# Patient Record
Sex: FEMALE | Race: WHITE | NOT HISPANIC OR LATINO | Employment: OTHER | URBAN - METROPOLITAN AREA
[De-identification: names, ages, dates, MRNs, and addresses within clinical notes are randomized per-mention and may not be internally consistent; named-entity substitution may affect disease eponyms.]

---

## 2018-02-02 ENCOUNTER — OFFICE VISIT (OUTPATIENT)
Dept: URGENT CARE | Facility: CLINIC | Age: 58
End: 2018-02-02
Payer: COMMERCIAL

## 2018-02-02 VITALS
BODY MASS INDEX: 28 KG/M2 | HEART RATE: 88 BPM | SYSTOLIC BLOOD PRESSURE: 130 MMHG | OXYGEN SATURATION: 98 % | TEMPERATURE: 98.7 F | DIASTOLIC BLOOD PRESSURE: 80 MMHG | HEIGHT: 64 IN | WEIGHT: 164 LBS

## 2018-02-02 DIAGNOSIS — R05.9 COUGH: ICD-10-CM

## 2018-02-02 DIAGNOSIS — J06.9 UPPER RESPIRATORY TRACT INFECTION, UNSPECIFIED TYPE: ICD-10-CM

## 2018-02-02 PROCEDURE — 99214 OFFICE O/P EST MOD 30 MIN: CPT | Performed by: PHYSICIAN ASSISTANT

## 2018-02-02 RX ORDER — AZITHROMYCIN 250 MG/1
TABLET, FILM COATED ORAL
Qty: 6 TAB | Refills: 0 | Status: SHIPPED | OUTPATIENT
Start: 2018-02-02 | End: 2020-06-24

## 2018-02-02 RX ORDER — BENZONATATE 100 MG/1
100 CAPSULE ORAL 3 TIMES DAILY PRN
Qty: 60 CAP | Refills: 0 | Status: SHIPPED | OUTPATIENT
Start: 2018-02-02 | End: 2020-06-24

## 2018-02-02 ASSESSMENT — ENCOUNTER SYMPTOMS
COUGH: 1
WHEEZING: 0
DIARRHEA: 0
SHORTNESS OF BREATH: 0
MYALGIAS: 0
VOMITING: 0
SPUTUM PRODUCTION: 0
FEVER: 0
NAUSEA: 0
SORE THROAT: 0
CHILLS: 0
ABDOMINAL PAIN: 0

## 2018-02-02 NOTE — PROGRESS NOTES
"Subjective:   Chioma Yanez is a 57 y.o. female who presents for Cough (\"has been having a cough for about 3 weeks now\")        Cough   This is a new problem. The current episode started 1 to 4 weeks ago. The cough is non-productive. Associated symptoms include nasal congestion. Pertinent negatives include no chills, ear pain, fever, myalgias ( resolved), rash, sore throat, shortness of breath or wheezing. Her past medical history is significant for environmental allergies.   notes fever/chills cough congestion body aches 3wks ago, now left w/ lingering cough at night, some production, denies much sinus congestion, denies ST/ear pain, denies chest congestion, denies nausea/vomitint/abdpain/diarrhea/rash, denies pMH of asthma, PMH of bronchitis/pneumonia years ago, PMH of seasonal allerg, tried honey, lemon, vinegar.     Review of Systems   Constitutional: Negative for chills, fever and malaise/fatigue.   HENT: Positive for congestion. Negative for ear pain and sore throat.    Respiratory: Positive for cough. Negative for sputum production, shortness of breath and wheezing.    Gastrointestinal: Negative for abdominal pain, diarrhea, nausea and vomiting.   Musculoskeletal: Negative for myalgias ( resolved).   Skin: Negative for rash.   Endo/Heme/Allergies: Positive for environmental allergies.     Allergies   Allergen Reactions   • Codeine Shortness of Breath      Objective:   /80   Pulse 88   Temp 37.1 °C (98.7 °F)   Ht 1.626 m (5' 4\")   Wt 74.4 kg (164 lb)   SpO2 98%   BMI 28.15 kg/m²   Physical Exam   Constitutional: She is oriented to person, place, and time. She appears well-developed and well-nourished. No distress.   HENT:   Head: Normocephalic and atraumatic.   Right Ear: Tympanic membrane, external ear and ear canal normal.   Left Ear: Tympanic membrane, external ear and ear canal normal.   Nose: Nose normal.   Mouth/Throat: Uvula is midline and mucous membranes are normal. Posterior " oropharyngeal erythema ( mild PND) present. No oropharyngeal exudate, posterior oropharyngeal edema or tonsillar abscesses.   Eyes: Conjunctivae and lids are normal. Right eye exhibits no discharge. Left eye exhibits no discharge. No scleral icterus.   Neck: Neck supple.   Pulmonary/Chest: Effort normal. No respiratory distress. She has no decreased breath sounds. She has no wheezes. She has no rhonchi. She has no rales.   Musculoskeletal: Normal range of motion.   Lymphadenopathy:     She has cervical adenopathy ( mild bilat).   Neurological: She is alert and oriented to person, place, and time. She is not disoriented.   Skin: Skin is warm and dry. She is not diaphoretic. No erythema. No pallor.   Psychiatric: Her speech is normal and behavior is normal.   Nursing note and vitals reviewed.        Assessment/Plan:   Assessment    1. Upper respiratory tract infection, unspecified type  Supportive care is reviewed with patient/caregiver - recommend to push PO fluids and electrolytes, Nsaids/tylenol, netti pot/saline irrig, humidifier in home, flonase, cough suppressant, discuss likely resolving URI s/sx, normal timeframe and s/sx - Contingent antibiotic prescription given to patient to fill upon meeting criteria of guidelines discussed.   If filling , take full course of Rx, take with probiotics, observe for resolution  Return to clinic with lack of resolution or progression of symptoms.    - azithromycin (ZITHROMAX) 250 MG Tab; Take as directed on package. Dispense one package.  Dispense: 6 Tab; Refill: 0    2. Cough  - benzonatate (TESSALON) 100 MG Cap; Take 1 Cap by mouth 3 times a day as needed for Cough.  Dispense: 60 Cap; Refill: 0    Differential diagnosis, natural history, supportive care, and indications for immediate follow-up discussed.

## 2018-03-26 ENCOUNTER — HOSPITAL ENCOUNTER (OUTPATIENT)
Dept: LAB | Facility: MEDICAL CENTER | Age: 58
End: 2018-03-26
Attending: FAMILY MEDICINE
Payer: COMMERCIAL

## 2018-03-26 LAB
25(OH)D3 SERPL-MCNC: 30 NG/ML (ref 30–100)
ALBUMIN SERPL BCP-MCNC: 4.2 G/DL (ref 3.2–4.9)
ALBUMIN/GLOB SERPL: 1.4 G/DL
ALP SERPL-CCNC: 74 U/L (ref 30–99)
ALT SERPL-CCNC: 15 U/L (ref 2–50)
ANION GAP SERPL CALC-SCNC: 6 MMOL/L (ref 0–11.9)
AST SERPL-CCNC: 14 U/L (ref 12–45)
BASOPHILS # BLD AUTO: 0.7 % (ref 0–1.8)
BASOPHILS # BLD: 0.04 K/UL (ref 0–0.12)
BILIRUB SERPL-MCNC: 0.6 MG/DL (ref 0.1–1.5)
BUN SERPL-MCNC: 13 MG/DL (ref 8–22)
CALCIUM SERPL-MCNC: 9 MG/DL (ref 8.5–10.5)
CHLORIDE SERPL-SCNC: 107 MMOL/L (ref 96–112)
CHOLEST SERPL-MCNC: 221 MG/DL (ref 100–199)
CO2 SERPL-SCNC: 26 MMOL/L (ref 20–33)
CREAT SERPL-MCNC: 0.61 MG/DL (ref 0.5–1.4)
CREAT UR-MCNC: 136.8 MG/DL
EOSINOPHIL # BLD AUTO: 0.13 K/UL (ref 0–0.51)
EOSINOPHIL NFR BLD: 2.1 % (ref 0–6.9)
ERYTHROCYTE [DISTWIDTH] IN BLOOD BY AUTOMATED COUNT: 45.9 FL (ref 35.9–50)
EST. AVERAGE GLUCOSE BLD GHB EST-MCNC: 137 MG/DL
GLOBULIN SER CALC-MCNC: 3.1 G/DL (ref 1.9–3.5)
GLUCOSE SERPL-MCNC: 122 MG/DL (ref 65–99)
HBA1C MFR BLD: 6.4 % (ref 0–5.6)
HCT VFR BLD AUTO: 44 % (ref 37–47)
HDLC SERPL-MCNC: 40 MG/DL
HGB BLD-MCNC: 14 G/DL (ref 12–16)
IMM GRANULOCYTES # BLD AUTO: 0.03 K/UL (ref 0–0.11)
IMM GRANULOCYTES NFR BLD AUTO: 0.5 % (ref 0–0.9)
LDLC SERPL CALC-MCNC: 136 MG/DL
LYMPHOCYTES # BLD AUTO: 1.96 K/UL (ref 1–4.8)
LYMPHOCYTES NFR BLD: 32.3 % (ref 22–41)
MCH RBC QN AUTO: 29 PG (ref 27–33)
MCHC RBC AUTO-ENTMCNC: 31.8 G/DL (ref 33.6–35)
MCV RBC AUTO: 91.1 FL (ref 81.4–97.8)
MICROALBUMIN UR-MCNC: <0.7 MG/DL
MICROALBUMIN/CREAT UR: NORMAL MG/G (ref 0–30)
MONOCYTES # BLD AUTO: 0.4 K/UL (ref 0–0.85)
MONOCYTES NFR BLD AUTO: 6.6 % (ref 0–13.4)
NEUTROPHILS # BLD AUTO: 3.51 K/UL (ref 2–7.15)
NEUTROPHILS NFR BLD: 57.8 % (ref 44–72)
NRBC # BLD AUTO: 0 K/UL
NRBC BLD-RTO: 0 /100 WBC
PLATELET # BLD AUTO: 259 K/UL (ref 164–446)
PMV BLD AUTO: 9.9 FL (ref 9–12.9)
POTASSIUM SERPL-SCNC: 4.1 MMOL/L (ref 3.6–5.5)
PROT SERPL-MCNC: 7.3 G/DL (ref 6–8.2)
RBC # BLD AUTO: 4.83 M/UL (ref 4.2–5.4)
SODIUM SERPL-SCNC: 139 MMOL/L (ref 135–145)
TRIGL SERPL-MCNC: 224 MG/DL (ref 0–149)
VIT B12 SERPL-MCNC: 486 PG/ML (ref 211–911)
WBC # BLD AUTO: 6.1 K/UL (ref 4.8–10.8)

## 2018-03-26 PROCEDURE — 82306 VITAMIN D 25 HYDROXY: CPT

## 2018-03-26 PROCEDURE — 82607 VITAMIN B-12: CPT

## 2018-03-26 PROCEDURE — 36415 COLL VENOUS BLD VENIPUNCTURE: CPT

## 2018-03-26 PROCEDURE — 80053 COMPREHEN METABOLIC PANEL: CPT

## 2018-03-26 PROCEDURE — 83036 HEMOGLOBIN GLYCOSYLATED A1C: CPT

## 2018-03-26 PROCEDURE — 80061 LIPID PANEL: CPT

## 2018-03-26 PROCEDURE — 82043 UR ALBUMIN QUANTITATIVE: CPT

## 2018-03-26 PROCEDURE — 85025 COMPLETE CBC W/AUTO DIFF WBC: CPT

## 2018-03-26 PROCEDURE — 82570 ASSAY OF URINE CREATININE: CPT

## 2019-08-22 ENCOUNTER — HOSPITAL ENCOUNTER (OUTPATIENT)
Dept: LAB | Facility: MEDICAL CENTER | Age: 59
End: 2019-08-22
Attending: FAMILY MEDICINE
Payer: COMMERCIAL

## 2019-08-22 LAB
25(OH)D3 SERPL-MCNC: 22 NG/ML (ref 30–100)
ALBUMIN SERPL BCP-MCNC: 4.1 G/DL (ref 3.2–4.9)
ALBUMIN/GLOB SERPL: 1.2 G/DL
ALP SERPL-CCNC: 65 U/L (ref 30–99)
ALT SERPL-CCNC: 18 U/L (ref 2–50)
ANION GAP SERPL CALC-SCNC: 9 MMOL/L (ref 0–11.9)
AST SERPL-CCNC: 19 U/L (ref 12–45)
BASOPHILS # BLD AUTO: 0.8 % (ref 0–1.8)
BASOPHILS # BLD: 0.06 K/UL (ref 0–0.12)
BILIRUB SERPL-MCNC: 0.7 MG/DL (ref 0.1–1.5)
BUN SERPL-MCNC: 12 MG/DL (ref 8–22)
CALCIUM SERPL-MCNC: 9.3 MG/DL (ref 8.5–10.5)
CHLORIDE SERPL-SCNC: 105 MMOL/L (ref 96–112)
CHOLEST SERPL-MCNC: 228 MG/DL (ref 100–199)
CO2 SERPL-SCNC: 26 MMOL/L (ref 20–33)
CREAT SERPL-MCNC: 0.53 MG/DL (ref 0.5–1.4)
CREAT UR-MCNC: 154.9 MG/DL
EOSINOPHIL # BLD AUTO: 0.14 K/UL (ref 0–0.51)
EOSINOPHIL NFR BLD: 1.8 % (ref 0–6.9)
ERYTHROCYTE [DISTWIDTH] IN BLOOD BY AUTOMATED COUNT: 46.7 FL (ref 35.9–50)
EST. AVERAGE GLUCOSE BLD GHB EST-MCNC: 157 MG/DL
GLOBULIN SER CALC-MCNC: 3.3 G/DL (ref 1.9–3.5)
GLUCOSE SERPL-MCNC: 133 MG/DL (ref 65–99)
HBA1C MFR BLD: 7.1 % (ref 0–5.6)
HCT VFR BLD AUTO: 44 % (ref 37–47)
HDLC SERPL-MCNC: 35 MG/DL
HGB BLD-MCNC: 13.7 G/DL (ref 12–16)
IMM GRANULOCYTES # BLD AUTO: 0.02 K/UL (ref 0–0.11)
IMM GRANULOCYTES NFR BLD AUTO: 0.3 % (ref 0–0.9)
LDLC SERPL CALC-MCNC: 154 MG/DL
LYMPHOCYTES # BLD AUTO: 2.42 K/UL (ref 1–4.8)
LYMPHOCYTES NFR BLD: 31.6 % (ref 22–41)
MCH RBC QN AUTO: 28.6 PG (ref 27–33)
MCHC RBC AUTO-ENTMCNC: 31.1 G/DL (ref 33.6–35)
MCV RBC AUTO: 91.9 FL (ref 81.4–97.8)
MICROALBUMIN UR-MCNC: 0.7 MG/DL
MICROALBUMIN/CREAT UR: 5 MG/G (ref 0–30)
MONOCYTES # BLD AUTO: 0.55 K/UL (ref 0–0.85)
MONOCYTES NFR BLD AUTO: 7.2 % (ref 0–13.4)
NEUTROPHILS # BLD AUTO: 4.47 K/UL (ref 2–7.15)
NEUTROPHILS NFR BLD: 58.3 % (ref 44–72)
NRBC # BLD AUTO: 0 K/UL
NRBC BLD-RTO: 0 /100 WBC
PLATELET # BLD AUTO: 273 K/UL (ref 164–446)
PMV BLD AUTO: 9.8 FL (ref 9–12.9)
POTASSIUM SERPL-SCNC: 3.9 MMOL/L (ref 3.6–5.5)
PROT SERPL-MCNC: 7.4 G/DL (ref 6–8.2)
RBC # BLD AUTO: 4.79 M/UL (ref 4.2–5.4)
SODIUM SERPL-SCNC: 140 MMOL/L (ref 135–145)
TRIGL SERPL-MCNC: 193 MG/DL (ref 0–149)
VIT B12 SERPL-MCNC: 595 PG/ML (ref 211–911)
WBC # BLD AUTO: 7.7 K/UL (ref 4.8–10.8)

## 2019-08-22 PROCEDURE — 83036 HEMOGLOBIN GLYCOSYLATED A1C: CPT

## 2019-08-22 PROCEDURE — 82043 UR ALBUMIN QUANTITATIVE: CPT

## 2019-08-22 PROCEDURE — 36415 COLL VENOUS BLD VENIPUNCTURE: CPT

## 2019-08-22 PROCEDURE — 80053 COMPREHEN METABOLIC PANEL: CPT

## 2019-08-22 PROCEDURE — 85025 COMPLETE CBC W/AUTO DIFF WBC: CPT

## 2019-08-22 PROCEDURE — 82306 VITAMIN D 25 HYDROXY: CPT

## 2019-08-22 PROCEDURE — 82570 ASSAY OF URINE CREATININE: CPT

## 2019-08-22 PROCEDURE — 80061 LIPID PANEL: CPT

## 2019-08-22 PROCEDURE — 82607 VITAMIN B-12: CPT

## 2019-09-23 ENCOUNTER — APPOINTMENT (OUTPATIENT)
Dept: RADIOLOGY | Facility: MEDICAL CENTER | Age: 59
End: 2019-09-23
Attending: FAMILY MEDICINE
Payer: COMMERCIAL

## 2019-10-21 ENCOUNTER — APPOINTMENT (OUTPATIENT)
Dept: RADIOLOGY | Facility: MEDICAL CENTER | Age: 59
End: 2019-10-21
Attending: FAMILY MEDICINE
Payer: COMMERCIAL

## 2019-12-04 ENCOUNTER — APPOINTMENT (OUTPATIENT)
Dept: RADIOLOGY | Facility: MEDICAL CENTER | Age: 59
End: 2019-12-04
Attending: FAMILY MEDICINE
Payer: COMMERCIAL

## 2020-01-22 ENCOUNTER — HOSPITAL ENCOUNTER (OUTPATIENT)
Dept: RADIOLOGY | Facility: MEDICAL CENTER | Age: 60
End: 2020-01-22
Attending: FAMILY MEDICINE
Payer: COMMERCIAL

## 2020-01-22 DIAGNOSIS — Z00.00 ROUTINE GENERAL MEDICAL EXAMINATION AT A HEALTH CARE FACILITY: ICD-10-CM

## 2020-01-22 PROCEDURE — 77067 SCR MAMMO BI INCL CAD: CPT

## 2020-06-19 ENCOUNTER — OFFICE VISIT (OUTPATIENT)
Dept: URGENT CARE | Facility: CLINIC | Age: 60
End: 2020-06-19
Payer: COMMERCIAL

## 2020-06-19 ENCOUNTER — APPOINTMENT (OUTPATIENT)
Dept: RADIOLOGY | Facility: IMAGING CENTER | Age: 60
End: 2020-06-19
Attending: FAMILY MEDICINE
Payer: COMMERCIAL

## 2020-06-19 VITALS
RESPIRATION RATE: 18 BRPM | TEMPERATURE: 99.2 F | BODY MASS INDEX: 28.15 KG/M2 | OXYGEN SATURATION: 97 % | DIASTOLIC BLOOD PRESSURE: 78 MMHG | SYSTOLIC BLOOD PRESSURE: 124 MMHG | HEART RATE: 78 BPM | WEIGHT: 164 LBS

## 2020-06-19 DIAGNOSIS — S93.402A SPRAIN OF LEFT ANKLE, UNSPECIFIED LIGAMENT, INITIAL ENCOUNTER: ICD-10-CM

## 2020-06-19 PROCEDURE — 73610 X-RAY EXAM OF ANKLE: CPT | Mod: TC,LT | Performed by: FAMILY MEDICINE

## 2020-06-19 PROCEDURE — 99214 OFFICE O/P EST MOD 30 MIN: CPT | Performed by: FAMILY MEDICINE

## 2020-06-19 SDOH — HEALTH STABILITY: MENTAL HEALTH: HOW OFTEN DO YOU HAVE A DRINK CONTAINING ALCOHOL?: 2-3 TIMES A WEEK

## 2020-06-19 ASSESSMENT — FIBROSIS 4 INDEX: FIB4 SCORE: 0.98

## 2020-06-19 NOTE — PROGRESS NOTES
Subjective:      Chief Complaint   Patient presents with   • Ankle Injury     x3 days, has previous knee injury which caused fall and pt states she twisted ankle             Ankle Injury   The incident occurred 2 d  ago. The incident occurred  while hiking. The injury mechanism was an inversion injury. The pain is present in the left ankle. The pain is moderate. The pain has been constant since onset. Pertinent negatives include no inability to bear weight, loss of motion, muscle weakness, numbness or tingling. The symptoms are aggravated by weight bearing and palpation. pt has tried acetaminophen for the symptoms. The treatment provided mild relief.       Social History     Tobacco Use   • Smoking status: Never Smoker   • Smokeless tobacco: Never Used   Substance Use Topics   • Alcohol use: Yes     Alcohol/week: 0.6 oz     Types: 1 Standard drinks or equivalent per week     Frequency: 2-3 times a week   • Drug use: No         Past Medical History:   Diagnosis Date   • Arthritis    • Palpitations          Review of Systems   Constitutional: Negative for fever.   Respiratory: Negative for shortness of breath.    Cardiovascular: Negative for chest pain.   Neurological: Negative for tingling and numbness.   All other systems reviewed and are negative.         Objective:     /78   Pulse 78   Temp 37.3 °C (99.2 °F) (Temporal)   Resp 18   Wt 74.4 kg (164 lb)   SpO2 97%     Physical Exam   Constitutional: pt is oriented to person, place, and time. Pt appears well-developed. No distress.   HENT:   Head: Normocephalic and atraumatic.   Eyes: Conjunctivae are normal.   Cardiovascular: Normal rate and regular rhythm.    Pulmonary/Chest: Effort normal and breath sounds normal.   Musculoskeletal:        Left ankle: pt exhibits swelling and ecchymosis. Pt exhibits normal range of motion. Tenderness. Lateral malleolus tenderness found. Achilles tendon normal.        Left foot: There is normal range of motion, normal  capillary refill and no crepitus.   Neurological: pt is alert and oriented to person, place, and time. No cranial nerve deficit.   Skin: Skin is warm. Pt is not diaphoretic. No erythema.   Psychiatric: His behavior is normal.   Nursing note and vitals reviewed.              Assessment/Plan:     1. Sprain of left ankle, unspecified ligament, initial encounter    X-rays were personally reviewed by myself.   There is a distal fib fracture.       NWB on left - crutches given  Stirrup splint applied  Referred to sports med    rx motrin 800mg tid prn

## 2020-06-24 ENCOUNTER — OFFICE VISIT (OUTPATIENT)
Dept: MEDICAL GROUP | Facility: CLINIC | Age: 60
End: 2020-06-24
Payer: COMMERCIAL

## 2020-06-24 VITALS
SYSTOLIC BLOOD PRESSURE: 136 MMHG | BODY MASS INDEX: 28 KG/M2 | DIASTOLIC BLOOD PRESSURE: 80 MMHG | HEIGHT: 64 IN | HEART RATE: 80 BPM | WEIGHT: 164 LBS | RESPIRATION RATE: 14 BRPM | OXYGEN SATURATION: 98 % | TEMPERATURE: 98.2 F

## 2020-06-24 DIAGNOSIS — S82.62XA CLOSED DISPLACED FRACTURE OF LATERAL MALLEOLUS OF LEFT FIBULA, INITIAL ENCOUNTER: ICD-10-CM

## 2020-06-24 PROCEDURE — 99214 OFFICE O/P EST MOD 30 MIN: CPT | Performed by: FAMILY MEDICINE

## 2020-06-24 ASSESSMENT — ENCOUNTER SYMPTOMS
FOCAL WEAKNESS: 0
VOMITING: 0
FEVER: 0
SHORTNESS OF BREATH: 0

## 2020-06-24 ASSESSMENT — FIBROSIS 4 INDEX: FIB4 SCORE: 0.98

## 2020-07-01 ENCOUNTER — APPOINTMENT (OUTPATIENT)
Dept: RADIOLOGY | Facility: IMAGING CENTER | Age: 60
End: 2020-07-01
Attending: FAMILY MEDICINE
Payer: COMMERCIAL

## 2020-07-01 ENCOUNTER — OFFICE VISIT (OUTPATIENT)
Dept: MEDICAL GROUP | Facility: CLINIC | Age: 60
End: 2020-07-01
Payer: COMMERCIAL

## 2020-07-01 VITALS
HEIGHT: 64 IN | HEART RATE: 72 BPM | DIASTOLIC BLOOD PRESSURE: 72 MMHG | WEIGHT: 164 LBS | SYSTOLIC BLOOD PRESSURE: 128 MMHG | BODY MASS INDEX: 28 KG/M2 | OXYGEN SATURATION: 98 % | RESPIRATION RATE: 14 BRPM | TEMPERATURE: 98.6 F

## 2020-07-01 DIAGNOSIS — M79.672 LEFT FOOT PAIN: ICD-10-CM

## 2020-07-01 DIAGNOSIS — S82.62XA CLOSED DISPLACED FRACTURE OF LATERAL MALLEOLUS OF LEFT FIBULA, INITIAL ENCOUNTER: ICD-10-CM

## 2020-07-01 PROCEDURE — 99213 OFFICE O/P EST LOW 20 MIN: CPT | Performed by: FAMILY MEDICINE

## 2020-07-01 PROCEDURE — 73610 X-RAY EXAM OF ANKLE: CPT | Mod: TC,LT | Performed by: FAMILY MEDICINE

## 2020-07-01 PROCEDURE — 73630 X-RAY EXAM OF FOOT: CPT | Mod: TC,LT | Performed by: FAMILY MEDICINE

## 2020-07-01 ASSESSMENT — FIBROSIS 4 INDEX: FIB4 SCORE: 0.98

## 2020-07-01 NOTE — PROGRESS NOTES
Subjective:     Chioma Yanez is a 60 y.o. female who presents for Ankle Injury (Left ankle injury follow up and recheck.)    HPI  Pt presents for follow-up left lateral malleolus fracture  Pt with injury about 2 weeks ago   Has been in walking boot for the past 1.5 weeks  Has noticed some improvement in pain and swelling  Able to start walking and walking boot  No repeat falls or injuries  No numbness or tingling  No new complaints today    Review of Systems   Constitutional: Negative for fever.   Respiratory: Negative for shortness of breath.    Cardiovascular: Negative for chest pain.   Gastrointestinal: Negative for vomiting.   Musculoskeletal: Positive for joint pain.   Skin: Negative for rash.   Neurological: Negative for tingling and sensory change.     PMH:  has a past medical history of Arthritis and Palpitations. She also has no past medical history of Breast cancer (HCC).  MEDS:   Current Outpatient Medications:   •  metFORMIN (GLUCOPHAGE) 500 MG Tab, Take 500 mg by mouth every day., Disp: , Rfl:   •  cyanocobalamin (VITAMIN B-12) 100 MCG Tab, Take 100 mcg by mouth every day., Disp: , Rfl:   •  Flaxseed, Linseed, (FLAX SEED OIL PO), Take 1 Cap by mouth every day., Disp: , Rfl:   •  ibuprofen (MOTRIN) 200 MG Tab, Take 200 mg by mouth every 6 hours as needed for Mild Pain or Inflammation., Disp: , Rfl:   •  B Complex Vitamins (VITAMIN B COMPLEX PO), Take 1 Capsule by mouth every day., Disp: , Rfl:   •  ascorbic acid (ASCORBIC ACID) 500 MG Tab, Take 500 mg by mouth every day., Disp: , Rfl:   •  Calcium-Magnesium-Zinc (MICAELA-MAG-ZINC PO), Take 1 Capsule by mouth every day., Disp: , Rfl:   ALLERGIES:   Allergies   Allergen Reactions   • Codeine Shortness of Breath     SURGHX:   Past Surgical History:   Procedure Laterality Date   • KNEE ARTHROSCOPY Left 12/2/2015    Procedure: KNEE ARTHROSCOPY;  Surgeon: Edwin Simeon M.D.;  Location: SURGERY [facility];  Service:    • MENISCECTOMY Left 12/2/2015  "   Procedure: MENISCECTOMY partial lateral ;  Surgeon: Edwin Simeon M.D.;  Location: SURGERY [facility];  Service:    • CHONDROPLASTY Left 12/2/2015    Procedure: CHONDROPLASTY;  Surgeon: Edwin Simeon M.D.;  Location: SURGERY [facility];  Service:    • PB REPAIR EYE BLOWOUT,PERIORB+IMPLNT  2006    Left eye fracture repair   • PB C-SEC ONLY,PBEV C-SEC  1981     SOCHX:  reports that she has never smoked. She has never used smokeless tobacco. She reports current alcohol use of about 0.6 oz of alcohol per week. She reports that she does not use drugs.  FH: Family history was reviewed, not contributing to acute injury     Objective:   /72 (BP Location: Left arm, Patient Position: Sitting, BP Cuff Size: Adult)   Pulse 72   Temp 37 °C (98.6 °F) (Temporal)   Resp 14   Ht 1.626 m (5' 4\")   Wt 74.4 kg (164 lb)   SpO2 98%   BMI 28.15 kg/m²     Physical Exam  Constitutional:       General: She is not in acute distress.     Appearance: She is well-developed. She is not diaphoretic.   HENT:      Head: Normocephalic and atraumatic.   Musculoskeletal:      Comments: Left ankle/foot:  Appearance - +Mild swelling throughout foot   Palpation - +TTP along dorsal midfoot and lateral malleolus   Neurovascular - 2+ dorsalis pedis and posterior tibial.  Sensation intact and equal bilaterally   Skin:     General: Skin is warm and dry.      Findings: No erythema.   Neurological:      Mental Status: She is alert and oriented to person, place, and time.      Sensory: No sensory deficit.   Psychiatric:         Behavior: Behavior normal.         Thought Content: Thought content normal.         Judgment: Judgment normal.         Assessment/Plan:   Assessment    1. Closed displaced fracture of lateral malleolus of left fibula, initial encounter  - DX-ANKLE 3+ VIEWS LEFT; Future  - REFERRAL TO PHYSICAL THERAPY Reason for Therapy: Eval/Treat/Report    2. Left foot pain  - DX-FOOT-COMPLETE 3+ LEFT; Future  - REFERRAL TO " PHYSICAL THERAPY Reason for Therapy: Eval/Treat/Report    Patient with distal fibular fracture which has been immobilized using tall Cam walking boot.  Repeat x-ray today does not show worsening of fracture line.  Foot x-rays do not show missed fracture in foot.  Will complete full 6 weeks of immobilization (until August 5, 2020).  Patient will follow-up in 4 weeks to reevaluate.

## 2020-07-14 ASSESSMENT — ENCOUNTER SYMPTOMS
FEVER: 0
SHORTNESS OF BREATH: 0
VOMITING: 0
TINGLING: 0
SENSORY CHANGE: 0

## 2020-08-05 ENCOUNTER — APPOINTMENT (OUTPATIENT)
Dept: RADIOLOGY | Facility: IMAGING CENTER | Age: 60
End: 2020-08-05
Attending: FAMILY MEDICINE
Payer: COMMERCIAL

## 2020-08-05 ENCOUNTER — OFFICE VISIT (OUTPATIENT)
Dept: MEDICAL GROUP | Facility: CLINIC | Age: 60
End: 2020-08-05
Payer: COMMERCIAL

## 2020-08-05 VITALS
TEMPERATURE: 98.2 F | DIASTOLIC BLOOD PRESSURE: 80 MMHG | SYSTOLIC BLOOD PRESSURE: 120 MMHG | HEIGHT: 64 IN | BODY MASS INDEX: 28 KG/M2 | HEART RATE: 76 BPM | RESPIRATION RATE: 12 BRPM | WEIGHT: 164 LBS | OXYGEN SATURATION: 98 %

## 2020-08-05 DIAGNOSIS — S82.62XA CLOSED DISPLACED FRACTURE OF LATERAL MALLEOLUS OF LEFT FIBULA, INITIAL ENCOUNTER: ICD-10-CM

## 2020-08-05 PROCEDURE — 73610 X-RAY EXAM OF ANKLE: CPT | Mod: TC,LT | Performed by: FAMILY MEDICINE

## 2020-08-05 PROCEDURE — 99213 OFFICE O/P EST LOW 20 MIN: CPT | Performed by: FAMILY MEDICINE

## 2020-08-05 ASSESSMENT — FIBROSIS 4 INDEX: FIB4 SCORE: 0.98

## 2020-08-05 ASSESSMENT — ENCOUNTER SYMPTOMS
FOCAL WEAKNESS: 0
SHORTNESS OF BREATH: 0
VOMITING: 0
FEVER: 0

## 2020-08-05 NOTE — PROGRESS NOTES
Subjective:     Chioma Yanez is a 60 y.o. female who presents for Foot Pain (Left foot pain and ankle, improving. No issues with the CAM boot and able to walk some without the CAM boot.)    HPI  Pt presents for follow-up left lateral malleolus fracture  Has been in walking boot for the last 6 weeks  Has greatly improved pain and now only occasionally has pains  Has tried walking without walking boot and feels that her ankle is stiff, however she is able to walk with relatively no pain  When she does have pain, always on the lateral aspect of ankle and does not radiate    Review of Systems   Constitutional: Negative for fever.   Respiratory: Negative for shortness of breath.    Cardiovascular: Negative for chest pain.   Gastrointestinal: Negative for vomiting.   Skin: Negative for rash.   Neurological: Negative for focal weakness.     PMH:  has a past medical history of Arthritis and Palpitations. She also has no past medical history of Breast cancer (HCC).  MEDS:   Current Outpatient Medications:   •  metFORMIN (GLUCOPHAGE) 500 MG Tab, Take 500 mg by mouth every day., Disp: , Rfl:   •  cyanocobalamin (VITAMIN B-12) 100 MCG Tab, Take 100 mcg by mouth every day., Disp: , Rfl:   •  Flaxseed, Linseed, (FLAX SEED OIL PO), Take 1 Cap by mouth every day., Disp: , Rfl:   •  ibuprofen (MOTRIN) 200 MG Tab, Take 200 mg by mouth every 6 hours as needed for Mild Pain or Inflammation., Disp: , Rfl:   •  B Complex Vitamins (VITAMIN B COMPLEX PO), Take 1 Capsule by mouth every day., Disp: , Rfl:   •  ascorbic acid (ASCORBIC ACID) 500 MG Tab, Take 500 mg by mouth every day., Disp: , Rfl:   •  Calcium-Magnesium-Zinc (MICAELA-MAG-ZINC PO), Take 1 Capsule by mouth every day., Disp: , Rfl:   ALLERGIES:   Allergies   Allergen Reactions   • Codeine Shortness of Breath     SURGHX:   Past Surgical History:   Procedure Laterality Date   • KNEE ARTHROSCOPY Left 12/2/2015    Procedure: KNEE ARTHROSCOPY;  Surgeon: Edwin Simeon M.D.;   "Location: SURGERY [facility];  Service:    • MENISCECTOMY Left 12/2/2015    Procedure: MENISCECTOMY partial lateral ;  Surgeon: Edwin Simeon M.D.;  Location: SURGERY [facility];  Service:    • CHONDROPLASTY Left 12/2/2015    Procedure: CHONDROPLASTY;  Surgeon: Edwin Simeon M.D.;  Location: SURGERY [facility];  Service:    • PB REPAIR EYE BLOWOUT,PERIORB+IMPLNT  2006    Left eye fracture repair   • PB C-SEC ONLY,PBEV C-SEC  1981     SOCHX:  reports that she has never smoked. She has never used smokeless tobacco. She reports current alcohol use of about 0.6 oz of alcohol per week. She reports that she does not use drugs.  FH: Family history was reviewed, not contributing to acute injury     Objective:   /80 (BP Location: Left arm, Patient Position: Sitting, BP Cuff Size: Adult)   Pulse 76   Temp 36.8 °C (98.2 °F) (Temporal)   Resp 12   Ht 1.626 m (5' 4\")   Wt 74.4 kg (164 lb)   SpO2 98%   BMI 28.15 kg/m²     Physical Exam  Constitutional:       General: She is not in acute distress.     Appearance: She is well-developed. She is not diaphoretic.   HENT:      Head: Normocephalic and atraumatic.   Musculoskeletal:      Comments: Left ankle/foot:  Appearance - +Mild swelling in ankle  Palpation - +TTP greatest along ATFL.  Has mild tenderness at the distal tip of the lateral malleolus  Strength - 5/5 throughout  Neurovascular - 2+ dorsalis pedis and posterior tibial.  Sensation intact and equal bilaterally   Skin:     General: Skin is warm and dry.      Findings: No erythema.   Neurological:      Mental Status: She is alert and oriented to person, place, and time.      Sensory: No sensory deficit.   Psychiatric:         Behavior: Behavior normal.         Thought Content: Thought content normal.         Judgment: Judgment normal.       Assessment/Plan:   Assessment    1. Closed displaced fracture of lateral malleolus of left fibula, initial encounter  - DX-ANKLE 3+ VIEWS LEFT; " Future    Patient with mild pain today on exam, which is more along the ATFL than at bony prominences.  Repeat x-ray was obtained and shows stable appearance.  We will wean out of walking boot and start physical therapy.  Follow-up in 6 weeks to monitor progress.

## 2020-08-13 ENCOUNTER — PHYSICAL THERAPY (OUTPATIENT)
Dept: PHYSICAL THERAPY | Facility: REHABILITATION | Age: 60
End: 2020-08-13
Attending: FAMILY MEDICINE
Payer: COMMERCIAL

## 2020-08-13 DIAGNOSIS — S82.62XA TRAUMATIC CLOSED DISPLACED FRACTURE OF LATERAL MALLEOLUS OF LEFT FIBULA, INITIAL ENCOUNTER: ICD-10-CM

## 2020-08-13 DIAGNOSIS — M79.672 LEFT FOOT PAIN: ICD-10-CM

## 2020-08-13 PROCEDURE — 97110 THERAPEUTIC EXERCISES: CPT

## 2020-08-13 PROCEDURE — 97161 PT EVAL LOW COMPLEX 20 MIN: CPT

## 2020-08-13 ASSESSMENT — ENCOUNTER SYMPTOMS
PAIN SCALE: 0
PAIN TIMING: INTERMITTENT
PAIN LOCATION: L LATERAL ANKLE
ALLEVIATING FACTORS: REST

## 2020-08-13 NOTE — OP THERAPY EVALUATION
"  Outpatient Physical Therapy  INITIAL EVALUATION    [facility] [address]  [address]  [place] [address]  Phone:  [phone]  Fax:  [phone]    Date of Evaluation: 08/13/2020    Patient: Chioma Yanez  YOB: 1960  MRN: [id number]     Referring Provider: Levy Aldridge M.D.  [address]  [address]  [place] [address]   Referring Diagnosis Closed displaced fracture of lateral malleolus of left fibula, initial encounter [S82.62XA];Left foot pain [M79.672]     Time Calculation    Start time: 1425  Stop time: 1520 Time Calculation (min): 55 minutes         Chief Complaint: Ankle Injury    Visit Diagnoses     ICD-10-CM   1. Traumatic closed displaced fracture of lateral malleolus of left fibula, initial encounter  S82.62XA   2. Left foot pain  M79.672         Subjective:   History of Present Illness:     Date of onset:  6/19/2020    Mechanism of injury:  Pt with hx of L knee meniscus injury and partial lateral meniscectomy in 2015.  States L knee \"denae\"/ hyperextends, since surgery.  Feels unstable and LLE has always felt weaker since surgery.   Pt is an avid hiker.  In mid-June, hiking down hill, she rolled her L ankle, resulting in closed displaced fracture of lateral malleolus.  Pt was placed in a walking boot on 6/24, WBAT.  Pt saw sports med physician for follow up last week and states the boot was discontinued.  Has been walking on level surfaces for exercise without increased symptoms.  Walking down gentle slope feels \"tender\".  Doing standing leg lifts at home for knee and hip strengthening.    Pt is a musician (drummer).  Uses L foot to press pedal for small cymbals.  Has not been drumming since injury.  Prior to injury, pt hiked 3-4x/week, often half day hikes.  Pt states she has been using hiking poles since L knee surgery.  Hx of armendariz's neuroma, plantar fasciitis, R foot fracture.  Sleep disturbance:  Not disrupted  Pain:     " Current pain ratin    Location:  L lateral ankle    Quality: sore.    Pain timing:  Intermittent    Relieving factors:  Rest    Exacerbated by: walking downhill.    Progression:  Improving  Social Support:     Lives in:  One-story house  Diagnostic Tests:     X-ray: abnormal      Diagnostic Tests Comments:  Subtle fracture of the distal fibula is less distinct, likely representing some interval healing. There is no change in alignment. No new fracture. Small calcific densities about the tip of the medial malleolus is likely related to old trauma. There is some mild soft tissue swelling about the ankle.     Treatments:     Previous treatment:  Immobilization    Current treatment:  Activity modification  Patient Goals:     Patient goals for therapy:  Decreased edema, increased strength and return to sport/leisure activities      Past Medical History:   Diagnosis Date   • Arthritis    • Palpitations      Past Surgical History:   Procedure Laterality Date   • KNEE ARTHROSCOPY Left 2015    Procedure: KNEE ARTHROSCOPY;  Surgeon: Edwin Simeon M.D.;  Location: [facility];  Service:    • MENISCECTOMY Left 2015    Procedure: MENISCECTOMY partial lateral ;  Surgeon: Edwin Simeon M.D.;  Location: [facility];  Service:    • CHONDROPLASTY Left 2015    Procedure: CHONDROPLASTY;  Surgeon: Edwin Simeon M.D.;  Location: [facility];  Service:    • PB REPAIR EYE BLOWOUT,PERIORB+IMPLNT      Left eye fracture repair   • PB C-SEC ONLY,PBEV C-SEC       Social History     Tobacco Use   • Smoking status: Never Smoker   • Smokeless tobacco: Never Used   Substance Use Topics   • Alcohol use: Yes     Alcohol/week: 0.6 oz     Types: 1 Standard drinks or equivalent per week     Frequency: 2-3 times a week     Family and Occupational History     Socioeconomic History   • Marital status: Single     Spouse name: Not on file   • Number of children: Not on file   • Years of  education: Not on file   • Highest education level: Not on file   Occupational History   • Not on file       Objective     Observations   Left Ankle/Foot   Positive for edema.     Active Range of Motion   Left Ankle/Foot   Dorsiflexion (ke): -8 degrees   Plantar flexion: 45 degrees   Inversion: 25 degrees   Eversion: 10 degrees     Passive Range of Motion   Left Ankle/Foot    Dorsiflexion (ke): 8 degrees     Strength:      Left Knee   Normal strength    Right Knee   Flexion: 5  Extension: 4 (patellar pain)    Left Ankle/Foot   Dorsiflexion: 4  Plantar flexion: 4+  Inversion: 4  Eversion: 3+    Tests   Left Ankle/Foot   Negative for anterior drawer, posterior drawer, valgus tilt and varus tilt.     Swelling   Left Ankle/Foot   Figure 8: 49 cm  Malleoli: 25 cm    Right Ankle/Foot   Figure 8: 48.4 cm  Malleoli: 24.3 cm  Ambulation     Observational Gait   Gait: antalgic   Decreased left stance time.     Quality of Movement During Gait     Knee    Knee (Left): Positive increased ER tibial torsion.     Functional Assessment   Squat   Sitting toward right side.     Single Leg Stance   Left: 5 seconds    Comments  Standing B heel raise- asymmetric.          Therapeutic Exercises (CPT 49815):     1. Ankle x4 with resistance band    2. Ankle circles    3. Ankle alphabet    4. Minisquats    5. Standing heel raises    6. Standing toe taps    7. SLR, supine or standing    8. Bridges    9. SLS      Therapeutic Exercise Summary: Pt performed these exercises with instruction and SPV.  Provided handout with these exercises for daily HEP.      Time-based treatments/modalities:    Physical Therapy Timed Treatment Charges  Therapeutic exercise minutes (CPT 37182): 10 minutes      Assessment, Response and Plan:   Impairments: abnormal gait, activity intolerance, lacks appropriate home exercise program, pain with function and swelling    Assessment details:  60 y.o. female presents s/p L ankle sprain and displaced lateral malleolus  fracture.  Pt demonstrates decreased AROM and strength at ankle, impaired stability and strength of LLE and posterior chain, and unable to ji preferred activities (hiking, drumming).  Pt will benefit from skilled PT services to improve strength and balance, decrease risk of future injury to LLE, return to PLOF.  Barriers to therapy:  Out-of-pocket cost of treatment  Prognosis: good    Goals:   Short Term Goals:   1. L ankle DF AROM= 0 or greater.  2. L ankle MMTs= 5/5 in all directions.  3. Pt demo normalized gait pattern without AD, 500 feet, on level surfaces.  4. Pt will be independent with daily HEP.  Short term goal time span:  2-4 weeks      Long Term Goals:    1. Pt able to walk on level surfaces without AD or bracing, 1 hour.  2. SLS equal R and L.  3. Pt able to return to light hikes, with ankle brace if needed.  Long term goal time span:  6-8 weeks    Plan:   Therapy options:  Physical therapy treatment to continue  Planned therapy interventions:  E Stim Unattended (CPT 50467), Gait Training (CPT 07557), Manual Therapy (CPT 48999), Neuromuscular Re-education (CPT 47881), Therapeutic Activities (CPT 01969) and Therapeutic Exercise (CPT 40475)  Frequency:  2x month  Duration in weeks:  12  Discussed with:  Patient      Functional Assessment Used  Lower Extremity Functional Scale Total: 75     Referring provider co-signature:  I have reviewed this plan of care and my co-signature certifies the need for services.    Certification Period: 08/13/2020 to  11/06/20    Physician Signature: ________________________________ Date: ______________

## 2020-08-18 ENCOUNTER — APPOINTMENT (OUTPATIENT)
Dept: PHYSICAL THERAPY | Facility: REHABILITATION | Age: 60
End: 2020-08-18
Attending: FAMILY MEDICINE
Payer: COMMERCIAL

## 2020-08-21 ENCOUNTER — APPOINTMENT (OUTPATIENT)
Dept: PHYSICAL THERAPY | Facility: REHABILITATION | Age: 60
End: 2020-08-21
Attending: FAMILY MEDICINE
Payer: COMMERCIAL

## 2020-08-24 ENCOUNTER — PHYSICAL THERAPY (OUTPATIENT)
Dept: PHYSICAL THERAPY | Facility: REHABILITATION | Age: 60
End: 2020-08-24
Attending: FAMILY MEDICINE
Payer: COMMERCIAL

## 2020-08-24 DIAGNOSIS — M79.672 LEFT FOOT PAIN: ICD-10-CM

## 2020-08-24 DIAGNOSIS — S82.62XA TRAUMATIC CLOSED DISPLACED FRACTURE OF LATERAL MALLEOLUS OF LEFT FIBULA, INITIAL ENCOUNTER: ICD-10-CM

## 2020-08-24 PROCEDURE — 97110 THERAPEUTIC EXERCISES: CPT

## 2020-08-24 PROCEDURE — 97140 MANUAL THERAPY 1/> REGIONS: CPT

## 2020-08-24 NOTE — OP THERAPY DAILY TREATMENT
Outpatient Physical Therapy  DAILY TREATMENT     [facility] Physical [address]  [address]  [place] [address]  Phone:  [phone]  Fax:  [phone]    Date: 08/24/2020    Patient: Chioma Yanez  YOB: 1960  MRN: [id number]     Time Calculation    Start time: 1500  Stop time: 1530 Time Calculation (min): 30 minutes         Chief Complaint: Ankle Injury    Visit #: 2    SUBJECTIVE:  Pt reports ankle is feeling better and less swollen.  Initially, HEP make ankle feel more sore, but now feeling better and not sore with exercises.  Pt reports calf is very stiff/tight, janeth first thing in the morning.    OBJECTIVE:        Therapeutic Exercises (CPT 12646):     1. Trampoline, alt light jog, march, SLS, small DL bounce    2. Shuttle leg press, 5 cords BLEs x15, 4 cords LLE x15., L knee crepitus reported.    3. BAPS, level 2, L ankle, 2x10 CW/CCW    4. Wall squats, x10    5. Standing PF/DF on small rockerboard, x20    6. Ankle diagonals, x15, AROM, then with manual resistance    7. SLR, supine x15L    8. Bridges, x15    9. SLS, L, x20 sec x2    10. Long sitting towel calf stretch, 2x 30 sec    11. Rebounder with 3kg ball, regular stance, B tandem stance, x10 all    Therapeutic Treatments and Modalities:     1. Manual Therapy (CPT 44408), L talocural ant mobs Gr III, distraction GR III, passive ankle DF, STM calf    Time-based treatments/modalities:    Physical Therapy Timed Treatment Charges  Manual therapy minutes (CPT 71826): 10 minutes  Therapeutic exercise minutes (CPT 38692): 20 minutes        ASSESSMENT:   Response to treatment: Good return of AROM and gait s/p ankle sprain and fracture.  Pt reports improved gait ( less limp) following today's treatment.    PLAN/RECOMMENDATIONS:   Plan for treatment: therapy treatment to continue next visit.  Pt is going camping next week, will return the following week for follow up.  Planned interventions for next visit: continue with  current treatment.

## 2020-08-25 ENCOUNTER — APPOINTMENT (OUTPATIENT)
Dept: PHYSICAL THERAPY | Facility: REHABILITATION | Age: 60
End: 2020-08-25
Attending: FAMILY MEDICINE
Payer: COMMERCIAL

## 2020-08-27 ENCOUNTER — APPOINTMENT (OUTPATIENT)
Dept: PHYSICAL THERAPY | Facility: REHABILITATION | Age: 60
End: 2020-08-27
Attending: FAMILY MEDICINE
Payer: COMMERCIAL

## 2020-09-10 ENCOUNTER — PHYSICAL THERAPY (OUTPATIENT)
Dept: PHYSICAL THERAPY | Facility: REHABILITATION | Age: 60
End: 2020-09-10
Attending: FAMILY MEDICINE
Payer: COMMERCIAL

## 2020-09-10 DIAGNOSIS — S82.62XA TRAUMATIC CLOSED DISPLACED FRACTURE OF LATERAL MALLEOLUS OF LEFT FIBULA, INITIAL ENCOUNTER: ICD-10-CM

## 2020-09-10 PROCEDURE — 97110 THERAPEUTIC EXERCISES: CPT

## 2020-09-10 PROCEDURE — 97140 MANUAL THERAPY 1/> REGIONS: CPT

## 2020-09-10 NOTE — OP THERAPY DAILY TREATMENT
Outpatient Physical Therapy  DAILY TREATMENT     [facility] Physical [address].  [address]  [place] [address]  Phone:  [phone]  Fax:  [phone]    Date: 09/10/2020    Patient: Chioma Yanez  YOB: 1960  MRN: [id number]     Time Calculation    Start time: 1500  Stop time: 1530 Time Calculation (min): 30 minutes         Chief Complaint: Ankle Injury    Visit #: 3    SUBJECTIVE:  Leg got swollen following walking in sand.  States L knee is painful, but no ankle pain.  States she would like to return to physician or ortho doctor to get a referral to PT for her L knee.  She has hx of L meniscus surgery 4-5 years ago.  Pt has ordered a trampoline for exercise at home.  Has not tried returning to hiking yet, mostly due to the poor air quality lately.    OBJECTIVE:  Current objective measures: L ankle AROM: DF= 0, PF= 53, add and abd= WNL.  MMTs= 5/5 and painfree in all directions.         Therapeutic Exercises (CPT 62460):     3. BAPS, level 3, L ankle, x15 CW/CCW    5. Standing PF/DF on small rockerboard, x20    6. Ankle diagonals, x10L    7. Standing balance on small rockerboard, x30 sec    8. Gait, WNL    9. SLS, x20-30 sec B    Therapeutic Treatments and Modalities:     1. Manual Therapy (CPT 99210), L talocural post mobs Gr III, passive ankle DF, STM calf    Time-based treatments/modalities:    Physical Therapy Timed Treatment Charges  Manual therapy minutes (CPT 07051): 10 minutes  Therapeutic exercise minutes (CPT 39543): 20 minutes      ASSESSMENT:   Response to treatment: Pt demo good AROM and gait.  She states gait is good following PT treatment, but then she starts hobbling again due to calf tightness and L knee pain/weakness.  Recommend pt perform calf stretch on slant board at home 5-6 times a day, and use roller or massager on L calf if needed.  Return to short, light hikes when tolerated, and monitor swelling.    PLAN/RECOMMENDATIONS:   Plan for  treatment: discharge patient due to accomplished goals.

## 2020-09-11 NOTE — OP THERAPY DISCHARGE SUMMARY
Outpatient Physical Therapy  DISCHARGE SUMMARY NOTE      [facility] Physical Therapy [address]  [address]  [place] [address]  Phone:  [phone]  Fax:  [phone]    Date of Visit: 09/10/2020    Patient: Chioma Yanez  YOB: 1960  MRN: [id number]     Referring Provider: Levy Aldridge M.D.  [id number]  [place] [address]   Referring Diagnosis Closed displaced fracture of lateral malleolus of left fibula, initial encounter [S82.62XA];Left foot pain [M79.672]         Functional Assessment Used  Lower Extremity Functional Scale Total: 77.5     Your patient is being discharged from Physical Therapy with the following comments:   · Goals met    Limitations Remaining:  Pt reports limitations due to L knee.    Recommendations:  Continue HEP.  Follow up with ortho or PCP regarding L knee for possible PT referral.    Sarah Bright, PT    Date: 9/11/2020

## 2021-03-15 DIAGNOSIS — Z23 NEED FOR VACCINATION: ICD-10-CM

## 2021-03-19 ENCOUNTER — IMMUNIZATION (OUTPATIENT)
Dept: FAMILY PLANNING/WOMEN'S HEALTH CLINIC | Facility: IMMUNIZATION CENTER | Age: 61
End: 2021-03-19
Attending: INTERNAL MEDICINE
Payer: COMMERCIAL

## 2021-03-19 DIAGNOSIS — Z23 NEED FOR VACCINATION: ICD-10-CM

## 2021-03-19 DIAGNOSIS — Z23 ENCOUNTER FOR VACCINATION: Primary | ICD-10-CM

## 2021-03-19 PROCEDURE — 91301 MODERNA SARS-COV-2 VACCINE: CPT

## 2021-03-19 PROCEDURE — 0011A MODERNA SARS-COV-2 VACCINE: CPT

## 2021-04-17 ENCOUNTER — IMMUNIZATION (OUTPATIENT)
Dept: FAMILY PLANNING/WOMEN'S HEALTH CLINIC | Facility: IMMUNIZATION CENTER | Age: 61
End: 2021-04-17
Attending: INTERNAL MEDICINE
Payer: COMMERCIAL

## 2021-04-17 DIAGNOSIS — Z23 ENCOUNTER FOR VACCINATION: Primary | ICD-10-CM

## 2021-04-17 PROCEDURE — 0012A MODERNA SARS-COV-2 VACCINE: CPT

## 2021-04-17 PROCEDURE — 91301 MODERNA SARS-COV-2 VACCINE: CPT

## 2021-07-15 ENCOUNTER — OFFICE VISIT (OUTPATIENT)
Dept: URGENT CARE | Facility: CLINIC | Age: 61
End: 2021-07-15
Payer: COMMERCIAL

## 2021-07-15 VITALS
OXYGEN SATURATION: 99 % | BODY MASS INDEX: 26.6 KG/M2 | DIASTOLIC BLOOD PRESSURE: 74 MMHG | WEIGHT: 155.8 LBS | HEART RATE: 70 BPM | TEMPERATURE: 97 F | HEIGHT: 64 IN | RESPIRATION RATE: 14 BRPM | SYSTOLIC BLOOD PRESSURE: 118 MMHG

## 2021-07-15 DIAGNOSIS — H00.011 HORDEOLUM EXTERNUM OF RIGHT UPPER EYELID: ICD-10-CM

## 2021-07-15 PROCEDURE — 99213 OFFICE O/P EST LOW 20 MIN: CPT | Performed by: PHYSICIAN ASSISTANT

## 2021-07-15 RX ORDER — ERYTHROMYCIN 5 MG/G
OINTMENT OPHTHALMIC
Qty: 3.5 G | Refills: 0 | Status: SHIPPED | OUTPATIENT
Start: 2021-07-15 | End: 2022-10-12

## 2021-07-15 ASSESSMENT — FIBROSIS 4 INDEX: FIB4 SCORE: 1
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
## 2021-07-20 ASSESSMENT — ENCOUNTER SYMPTOMS
FEVER: 0
EYE ITCHING: 1
BLURRED VISION: 0
DOUBLE VISION: 0
FOREIGN BODY SENSATION: 0
EYE DISCHARGE: 1
EYE PAIN: 0
EYE REDNESS: 0
PHOTOPHOBIA: 0

## 2021-07-20 ASSESSMENT — VISUAL ACUITY: OU: 1

## 2022-06-10 ENCOUNTER — APPOINTMENT (OUTPATIENT)
Dept: RADIOLOGY | Facility: MEDICAL CENTER | Age: 62
DRG: 604 | End: 2022-06-10
Attending: EMERGENCY MEDICINE
Payer: COMMERCIAL

## 2022-06-10 ENCOUNTER — HOSPITAL ENCOUNTER (INPATIENT)
Facility: MEDICAL CENTER | Age: 62
LOS: 1 days | DRG: 604 | End: 2022-06-11
Attending: EMERGENCY MEDICINE | Admitting: SURGERY
Payer: COMMERCIAL

## 2022-06-10 DIAGNOSIS — S30.1XXA CONTUSION OF ABDOMINAL WALL, INITIAL ENCOUNTER: ICD-10-CM

## 2022-06-10 DIAGNOSIS — S06.6X0A SUBARACHNOID HEMORRHAGE FOLLOWING INJURY, NO LOSS OF CONSCIOUSNESS, INITIAL ENCOUNTER (HCC): ICD-10-CM

## 2022-06-10 DIAGNOSIS — I60.9 SUBARACHNOID HEMORRHAGE (HCC): ICD-10-CM
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
## 2022-06-10 DIAGNOSIS — R73.9 HYPERGLYCEMIA: ICD-10-CM

## 2022-06-10 PROBLEM — T14.90XA TRAUMA: Status: ACTIVE | Noted: 2022-06-10

## 2022-06-10 PROBLEM — S39.91XA BLUNT ABDOMINAL TRAUMA, INITIAL ENCOUNTER: Status: ACTIVE | Noted: 2022-06-10

## 2022-06-10 PROBLEM — Z53.09 CONTRAINDICATION TO ANTICOAGULATION THERAPY: Status: ACTIVE | Noted: 2022-06-10

## 2022-06-10 LAB
ABO GROUP BLD: NORMAL
ALBUMIN SERPL BCP-MCNC: 4.3 G/DL (ref 3.2–4.9)
ALBUMIN/GLOB SERPL: 1.3 G/DL
ALP SERPL-CCNC: 89 U/L (ref 30–99)
ALT SERPL-CCNC: 36 U/L (ref 2–50)
ANION GAP SERPL CALC-SCNC: 14 MMOL/L (ref 7–16)
APTT PPP: 25.9 SEC (ref 24.7–36)
AST SERPL-CCNC: 41 U/L (ref 12–45)
BILIRUB SERPL-MCNC: 0.3 MG/DL (ref 0.1–1.5)
BLD GP AB SCN SERPL QL: NORMAL
BUN SERPL-MCNC: 20 MG/DL (ref 8–22)
CALCIUM SERPL-MCNC: 9.3 MG/DL (ref 8.5–10.5)
CHLORIDE SERPL-SCNC: 104 MMOL/L (ref 96–112)
CO2 SERPL-SCNC: 22 MMOL/L (ref 20–33)
CREAT SERPL-MCNC: 0.58 MG/DL (ref 0.5–1.4)
ERYTHROCYTE [DISTWIDTH] IN BLOOD BY AUTOMATED COUNT: 43.8 FL (ref 35.9–50)
ETHANOL BLD-MCNC: <10.1 MG/DL
GFR SERPLBLD CREATININE-BSD FMLA CKD-EPI: 102 ML/MIN/1.73 M 2
GLOBULIN SER CALC-MCNC: 3.3 G/DL (ref 1.9–3.5)
GLUCOSE SERPL-MCNC: 287 MG/DL (ref 65–99)
HCG SERPL QL: NEGATIVE
HCT VFR BLD AUTO: 41.9 % (ref 37–47)
HGB BLD-MCNC: 13.9 G/DL (ref 12–16)
INR PPP: 1.04 (ref 0.87–1.13)
MCH RBC QN AUTO: 29.4 PG (ref 27–33)
MCHC RBC AUTO-ENTMCNC: 33.2 G/DL (ref 33.6–35)
MCV RBC AUTO: 88.6 FL (ref 81.4–97.8)
PLATELET # BLD AUTO: 296 K/UL (ref 164–446)
PMV BLD AUTO: 9.7 FL (ref 9–12.9)
POTASSIUM SERPL-SCNC: 4.1 MMOL/L (ref 3.6–5.5)
PROT SERPL-MCNC: 7.6 G/DL (ref 6–8.2)
PROTHROMBIN TIME: 13.3 SEC (ref 12–14.6)
RBC # BLD AUTO: 4.73 M/UL (ref 4.2–5.4)
RH BLD: NORMAL
SODIUM SERPL-SCNC: 140 MMOL/L (ref 135–145)
WBC # BLD AUTO: 18.8 K/UL (ref 4.8–10.8)

## 2022-06-10 PROCEDURE — 85610 PROTHROMBIN TIME: CPT

## 2022-06-10 PROCEDURE — 86850 RBC ANTIBODY SCREEN: CPT

## 2022-06-10 PROCEDURE — 86900 BLOOD TYPING SEROLOGIC ABO: CPT

## 2022-06-10 PROCEDURE — 86901 BLOOD TYPING SEROLOGIC RH(D): CPT

## 2022-06-10 PROCEDURE — 96374 THER/PROPH/DIAG INJ IV PUSH: CPT

## 2022-06-10 PROCEDURE — 70450 CT HEAD/BRAIN W/O DYE: CPT

## 2022-06-10 PROCEDURE — 99253 IP/OBS CNSLTJ NEW/EST LOW 45: CPT | Performed by: SURGERY

## 2022-06-10 PROCEDURE — 90471 IMMUNIZATION ADMIN: CPT

## 2022-06-10 PROCEDURE — 80053 COMPREHEN METABOLIC PANEL: CPT

## 2022-06-10 PROCEDURE — 73560 X-RAY EXAM OF KNEE 1 OR 2: CPT | Mod: LT

## 2022-06-10 PROCEDURE — 700105 HCHG RX REV CODE 258: Performed by: SURGERY

## 2022-06-10 PROCEDURE — 770001 HCHG ROOM/CARE - MED/SURG/GYN PRIV*

## 2022-06-10 PROCEDURE — 99285 EMERGENCY DEPT VISIT HI MDM: CPT

## 2022-06-10 PROCEDURE — 71045 X-RAY EXAM CHEST 1 VIEW: CPT

## 2022-06-10 PROCEDURE — 36415 COLL VENOUS BLD VENIPUNCTURE: CPT

## 2022-06-10 PROCEDURE — 85730 THROMBOPLASTIN TIME PARTIAL: CPT

## 2022-06-10 PROCEDURE — 72125 CT NECK SPINE W/O DYE: CPT

## 2022-06-10 PROCEDURE — 3E0234Z INTRODUCTION OF SERUM, TOXOID AND VACCINE INTO MUSCLE, PERCUTANEOUS APPROACH: ICD-10-PCS | Performed by: SURGERY

## 2022-06-10 PROCEDURE — 72131 CT LUMBAR SPINE W/O DYE: CPT

## 2022-06-10 PROCEDURE — 90715 TDAP VACCINE 7 YRS/> IM: CPT

## 2022-06-10 PROCEDURE — 85027 COMPLETE CBC AUTOMATED: CPT

## 2022-06-10 PROCEDURE — 305948 HCHG GREEN TRAUMA ACT PRE-NOTIFY NO CC

## 2022-06-10 PROCEDURE — 71260 CT THORAX DX C+: CPT

## 2022-06-10 PROCEDURE — 700111 HCHG RX REV CODE 636 W/ 250 OVERRIDE (IP)

## 2022-06-10 PROCEDURE — 72128 CT CHEST SPINE W/O DYE: CPT

## 2022-06-10 PROCEDURE — 700117 HCHG RX CONTRAST REV CODE 255: Performed by: EMERGENCY MEDICINE

## 2022-06-10 PROCEDURE — 84703 CHORIONIC GONADOTROPIN ASSAY: CPT

## 2022-06-10 PROCEDURE — 82077 ASSAY SPEC XCP UR&BREATH IA: CPT

## 2022-06-10 PROCEDURE — 72170 X-RAY EXAM OF PELVIS: CPT

## 2022-06-10 RX ORDER — HYDROMORPHONE HYDROCHLORIDE 1 MG/ML
0.5 INJECTION, SOLUTION INTRAMUSCULAR; INTRAVENOUS; SUBCUTANEOUS
Status: DISCONTINUED | OUTPATIENT
Start: 2022-06-10 | End: 2022-06-11 | Stop reason: HOSPADM

## 2022-06-10 RX ORDER — LORAZEPAM 2 MG/ML
1 INJECTION INTRAMUSCULAR ONCE
Status: DISCONTINUED | OUTPATIENT
Start: 2022-06-10 | End: 2022-06-11

## 2022-06-10 RX ORDER — DEXTROSE MONOHYDRATE 25 G/50ML
25 INJECTION, SOLUTION INTRAVENOUS
Status: DISCONTINUED | OUTPATIENT
Start: 2022-06-10 | End: 2022-06-11 | Stop reason: HOSPADM

## 2022-06-10 RX ORDER — ONDANSETRON 4 MG/1
4 TABLET, ORALLY DISINTEGRATING ORAL EVERY 4 HOURS PRN
Status: DISCONTINUED | OUTPATIENT
Start: 2022-06-10 | End: 2022-06-11 | Stop reason: HOSPADM

## 2022-06-10 RX ORDER — ACETAMINOPHEN 500 MG
1000 TABLET ORAL EVERY 6 HOURS
Status: DISCONTINUED | OUTPATIENT
Start: 2022-06-10 | End: 2022-06-11 | Stop reason: HOSPADM

## 2022-06-10 RX ORDER — AMOXICILLIN 250 MG
1 CAPSULE ORAL
Status: DISCONTINUED | OUTPATIENT
Start: 2022-06-10 | End: 2022-06-11 | Stop reason: HOSPADM

## 2022-06-10 RX ORDER — BACITRACIN ZINC AND POLYMYXIN B SULFATE 500; 1000 [USP'U]/G; [USP'U]/G
OINTMENT TOPICAL 3 TIMES DAILY
Status: DISCONTINUED | OUTPATIENT
Start: 2022-06-10 | End: 2022-06-11 | Stop reason: HOSPADM

## 2022-06-10 RX ORDER — BISACODYL 10 MG
10 SUPPOSITORY, RECTAL RECTAL
Status: DISCONTINUED | OUTPATIENT
Start: 2022-06-10 | End: 2022-06-11 | Stop reason: HOSPADM

## 2022-06-10 RX ORDER — POLYETHYLENE GLYCOL 3350 17 G/17G
1 POWDER, FOR SOLUTION ORAL 2 TIMES DAILY
Status: DISCONTINUED | OUTPATIENT
Start: 2022-06-10 | End: 2022-06-11 | Stop reason: HOSPADM

## 2022-06-10 RX ORDER — AMOXICILLIN 250 MG
1 CAPSULE ORAL NIGHTLY
Status: DISCONTINUED | OUTPATIENT
Start: 2022-06-10 | End: 2022-06-11 | Stop reason: HOSPADM

## 2022-06-10 RX ORDER — OXYCODONE HYDROCHLORIDE 10 MG/1
10 TABLET ORAL
Status: DISCONTINUED | OUTPATIENT
Start: 2022-06-10 | End: 2022-06-11 | Stop reason: HOSPADM

## 2022-06-10 RX ORDER — FAMOTIDINE 20 MG/1
20 TABLET, FILM COATED ORAL 2 TIMES DAILY
Status: DISCONTINUED | OUTPATIENT
Start: 2022-06-10 | End: 2022-06-11

## 2022-06-10 RX ORDER — CETIRIZINE HYDROCHLORIDE 10 MG/1
10 TABLET ORAL DAILY
COMMUNITY

## 2022-06-10 RX ORDER — CELECOXIB 200 MG/1
200 CAPSULE ORAL 2 TIMES DAILY PRN
Status: DISCONTINUED | OUTPATIENT
Start: 2022-06-15 | End: 2022-06-11 | Stop reason: HOSPADM

## 2022-06-10 RX ORDER — ONDANSETRON 2 MG/ML
4 INJECTION INTRAMUSCULAR; INTRAVENOUS EVERY 4 HOURS PRN
Status: DISCONTINUED | OUTPATIENT
Start: 2022-06-10 | End: 2022-06-11 | Stop reason: HOSPADM

## 2022-06-10 RX ORDER — MORPHINE SULFATE 4 MG/ML
4 INJECTION INTRAVENOUS ONCE
Status: COMPLETED | OUTPATIENT
Start: 2022-06-10 | End: 2022-06-10

## 2022-06-10 RX ORDER — ACETAMINOPHEN 500 MG
1000 TABLET ORAL EVERY 6 HOURS PRN
Status: DISCONTINUED | OUTPATIENT
Start: 2022-06-15 | End: 2022-06-11 | Stop reason: HOSPADM

## 2022-06-10 RX ORDER — DOCUSATE SODIUM 100 MG/1
100 CAPSULE, LIQUID FILLED ORAL 2 TIMES DAILY
Status: DISCONTINUED | OUTPATIENT
Start: 2022-06-10 | End: 2022-06-11 | Stop reason: HOSPADM

## 2022-06-10 RX ORDER — OXYCODONE HYDROCHLORIDE 5 MG/1
5 TABLET ORAL
Status: DISCONTINUED | OUTPATIENT
Start: 2022-06-10 | End: 2022-06-11 | Stop reason: HOSPADM

## 2022-06-10 RX ORDER — ENEMA 19; 7 G/133ML; G/133ML
1 ENEMA RECTAL
Status: DISCONTINUED | OUTPATIENT
Start: 2022-06-10 | End: 2022-06-11 | Stop reason: HOSPADM

## 2022-06-10 RX ORDER — CELECOXIB 200 MG/1
200 CAPSULE ORAL 2 TIMES DAILY
Status: DISCONTINUED | OUTPATIENT
Start: 2022-06-10 | End: 2022-06-11 | Stop reason: HOSPADM

## 2022-06-10 RX ORDER — SODIUM CHLORIDE, SODIUM LACTATE, POTASSIUM CHLORIDE, CALCIUM CHLORIDE 600; 310; 30; 20 MG/100ML; MG/100ML; MG/100ML; MG/100ML
INJECTION, SOLUTION INTRAVENOUS CONTINUOUS
Status: DISCONTINUED | OUTPATIENT
Start: 2022-06-10 | End: 2022-06-11

## 2022-06-10 RX ORDER — COVID-19 ANTIGEN TEST
440 KIT MISCELLANEOUS
COMMUNITY

## 2022-06-10 RX ADMIN — MORPHINE SULFATE 4 MG: 4 INJECTION INTRAVENOUS at 15:15

## 2022-06-10 RX ADMIN — IOHEXOL 100 ML: 350 INJECTION, SOLUTION INTRAVENOUS at 13:55

## 2022-06-10 RX ADMIN — CLOSTRIDIUM TETANI TOXOID ANTIGEN (FORMALDEHYDE INACTIVATED), CORYNEBACTERIUM DIPHTHERIAE TOXOID ANTIGEN (FORMALDEHYDE INACTIVATED), BORDETELLA PERTUSSIS TOXOID ANTIGEN (GLUTARALDEHYDE INACTIVATED), BORDETELLA PERTUSSIS FILAMENTOUS HEMAGGLUTININ ANTIGEN (FORMALDEHYDE INACTIVATED), BORDETELLA PERTUSSIS PERTACTIN ANTIGEN, AND BORDETELLA PERTUSSIS FIMBRIAE 2/3 ANTIGEN 0.5 ML: 5; 2; 2.5; 5; 3; 5 INJECTION, SUSPENSION INTRAMUSCULAR at 13:52

## 2022-06-10 RX ADMIN — SODIUM CHLORIDE, POTASSIUM CHLORIDE, SODIUM LACTATE AND CALCIUM CHLORIDE: 600; 310; 30; 20 INJECTION, SOLUTION INTRAVENOUS at 22:40

## 2022-06-10 ASSESSMENT — LIFESTYLE VARIABLES
EVER FELT BAD OR GUILTY ABOUT YOUR DRINKING: NO
ON A TYPICAL DAY WHEN YOU DRINK ALCOHOL HOW MANY DRINKS DO YOU HAVE: 0
HAVE YOU EVER FELT YOU SHOULD CUT DOWN ON YOUR DRINKING: NO
HAVE PEOPLE ANNOYED YOU BY CRITICIZING YOUR DRINKING: NO
TOTAL SCORE: 0
HOW MANY TIMES IN THE PAST YEAR HAVE YOU HAD 5 OR MORE DRINKS IN A DAY: 0
TOTAL SCORE: 0
TOTAL SCORE: 0
DOES PATIENT WANT TO STOP DRINKING: NO
ALCOHOL_USE: NO
CONSUMPTION TOTAL: NEGATIVE
AVERAGE NUMBER OF DAYS PER WEEK YOU HAVE A DRINK CONTAINING ALCOHOL: 0
EVER HAD A DRINK FIRST THING IN THE MORNING TO STEADY YOUR NERVES TO GET RID OF A HANGOVER: NO

## 2022-06-10 ASSESSMENT — PAIN DESCRIPTION - PAIN TYPE: TYPE: ACUTE PAIN
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
## 2022-06-10 NOTE — ED NOTES
Repeat neuro exam performed. No changes noted. Pt remains at baseline, no further needs at this time. Pt very thankful for care at this time and verbalizes understanding of plan.
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
## 2022-06-10 NOTE — ED NOTES
"Pt to room, brought blanket into room for pt, when asking if pt would like a blanket she snapped back \"hell no, my hip fucking hurts and I am too hot\" left blanket at side in case pt changes mind.   "
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
## 2022-06-11 ENCOUNTER — PHARMACY VISIT (OUTPATIENT)
Dept: PHARMACY | Facility: MEDICAL CENTER | Age: 62
End: 2022-06-11
Payer: COMMERCIAL

## 2022-06-11 ENCOUNTER — APPOINTMENT (OUTPATIENT)
Dept: RADIOLOGY | Facility: MEDICAL CENTER | Age: 62
DRG: 604 | End: 2022-06-11
Attending: SURGERY
Payer: COMMERCIAL

## 2022-06-11 VITALS
OXYGEN SATURATION: 97 % | RESPIRATION RATE: 18 BRPM | HEIGHT: 67 IN | WEIGHT: 160.05 LBS | BODY MASS INDEX: 25.12 KG/M2 | DIASTOLIC BLOOD PRESSURE: 65 MMHG | TEMPERATURE: 97 F | SYSTOLIC BLOOD PRESSURE: 135 MMHG | HEART RATE: 65 BPM

## 2022-06-11 PROBLEM — E11.9 TYPE 2 DIABETES MELLITUS (HCC): Status: ACTIVE | Noted: 2022-06-11

## 2022-06-11 LAB
ABO + RH BLD: NORMAL
ALBUMIN SERPL BCP-MCNC: 3.9 G/DL (ref 3.2–4.9)
ALBUMIN/GLOB SERPL: 1.2 G/DL
ALP SERPL-CCNC: 79 U/L (ref 30–99)
ALT SERPL-CCNC: 30 U/L (ref 2–50)
ANION GAP SERPL CALC-SCNC: 12 MMOL/L (ref 7–16)
AST SERPL-CCNC: 38 U/L (ref 12–45)
BASOPHILS # BLD AUTO: 0.5 % (ref 0–1.8)
BASOPHILS # BLD: 0.04 K/UL (ref 0–0.12)
BILIRUB SERPL-MCNC: 0.6 MG/DL (ref 0.1–1.5)
BUN SERPL-MCNC: 15 MG/DL (ref 8–22)
CALCIUM SERPL-MCNC: 9 MG/DL (ref 8.5–10.5)
CHLORIDE SERPL-SCNC: 105 MMOL/L (ref 96–112)
CO2 SERPL-SCNC: 22 MMOL/L (ref 20–33)
CREAT SERPL-MCNC: 0.47 MG/DL (ref 0.5–1.4)
EOSINOPHIL # BLD AUTO: 0.01 K/UL (ref 0–0.51)
EOSINOPHIL NFR BLD: 0.1 % (ref 0–6.9)
ERYTHROCYTE [DISTWIDTH] IN BLOOD BY AUTOMATED COUNT: 43.9 FL (ref 35.9–50)
EST. AVERAGE GLUCOSE BLD GHB EST-MCNC: 229 MG/DL
GFR SERPLBLD CREATININE-BSD FMLA CKD-EPI: 107 ML/MIN/1.73 M 2
GLOBULIN SER CALC-MCNC: 3.2 G/DL (ref 1.9–3.5)
GLUCOSE BLD STRIP.AUTO-MCNC: 154 MG/DL (ref 65–99)
GLUCOSE BLD STRIP.AUTO-MCNC: 183 MG/DL (ref 65–99)
GLUCOSE SERPL-MCNC: 179 MG/DL (ref 65–99)
HBA1C MFR BLD: 9.6 % (ref 4–5.6)
HCT VFR BLD AUTO: 38.2 % (ref 37–47)
HGB BLD-MCNC: 12.4 G/DL (ref 12–16)
IMM GRANULOCYTES # BLD AUTO: 0.02 K/UL (ref 0–0.11)
IMM GRANULOCYTES NFR BLD AUTO: 0.2 % (ref 0–0.9)
LYMPHOCYTES # BLD AUTO: 1.95 K/UL (ref 1–4.8)
LYMPHOCYTES NFR BLD: 22.1 % (ref 22–41)
MCH RBC QN AUTO: 28.8 PG (ref 27–33)
MCHC RBC AUTO-ENTMCNC: 32.5 G/DL (ref 33.6–35)
MCV RBC AUTO: 88.6 FL (ref 81.4–97.8)
MONOCYTES # BLD AUTO: 0.83 K/UL (ref 0–0.85)
MONOCYTES NFR BLD AUTO: 9.4 % (ref 0–13.4)
NEUTROPHILS # BLD AUTO: 5.96 K/UL (ref 2–7.15)
NEUTROPHILS NFR BLD: 67.7 % (ref 44–72)
NRBC # BLD AUTO: 0 K/UL
NRBC BLD-RTO: 0 /100 WBC
PLATELET # BLD AUTO: 244 K/UL (ref 164–446)
PMV BLD AUTO: 9.6 FL (ref 9–12.9)
POTASSIUM SERPL-SCNC: 4.1 MMOL/L (ref 3.6–5.5)
PROT SERPL-MCNC: 7.1 G/DL (ref 6–8.2)
RBC # BLD AUTO: 4.31 M/UL (ref 4.2–5.4)
SODIUM SERPL-SCNC: 139 MMOL/L (ref 135–145)
WBC # BLD AUTO: 8.8 K/UL (ref 4.8–10.8)

## 2022-06-11 PROCEDURE — 83036 HEMOGLOBIN GLYCOSYLATED A1C: CPT

## 2022-06-11 PROCEDURE — A9270 NON-COVERED ITEM OR SERVICE: HCPCS | Performed by: SURGERY

## 2022-06-11 PROCEDURE — 80053 COMPREHEN METABOLIC PANEL: CPT

## 2022-06-11 PROCEDURE — 700105 HCHG RX REV CODE 258: Performed by: SURGERY

## 2022-06-11 PROCEDURE — 700102 HCHG RX REV CODE 250 W/ 637 OVERRIDE(OP): Performed by: SURGERY

## 2022-06-11 PROCEDURE — 82962 GLUCOSE BLOOD TEST: CPT

## 2022-06-11 PROCEDURE — 36415 COLL VENOUS BLD VENIPUNCTURE: CPT

## 2022-06-11 PROCEDURE — RXMED WILLOW AMBULATORY MEDICATION CHARGE: Performed by: REGISTERED NURSE

## 2022-06-11 PROCEDURE — 85025 COMPLETE CBC W/AUTO DIFF WBC: CPT

## 2022-06-11 PROCEDURE — 71045 X-RAY EXAM CHEST 1 VIEW: CPT

## 2022-06-11 RX ORDER — OXYCODONE HYDROCHLORIDE 5 MG/1
5 TABLET ORAL EVERY 6 HOURS PRN
Qty: 12 TABLET | Refills: 0 | Status: SHIPPED | OUTPATIENT
Start: 2022-06-11 | End: 2022-06-14

## 2022-06-11 RX ORDER — CELECOXIB 200 MG/1
200 CAPSULE ORAL 2 TIMES DAILY
Qty: 10 CAPSULE | Refills: 0 | Status: SHIPPED | OUTPATIENT
Start: 2022-06-11 | End: 2022-06-16

## 2022-06-11 RX ORDER — ACETAMINOPHEN 325 MG/1
650 TABLET ORAL EVERY 6 HOURS PRN
COMMUNITY
Start: 2022-06-11 | End: 2022-10-12

## 2022-06-11 RX ORDER — BACITRACIN ZINC AND POLYMYXIN B SULFATE 500; 1000 [USP'U]/G; [USP'U]/G
OINTMENT TOPICAL 3 TIMES DAILY
Refills: 0 | COMMUNITY
Start: 2022-06-11 | End: 2022-10-12

## 2022-06-11 RX ADMIN — OXYCODONE 5 MG: 5 TABLET ORAL at 02:16

## 2022-06-11 RX ADMIN — SODIUM CHLORIDE, POTASSIUM CHLORIDE, SODIUM LACTATE AND CALCIUM CHLORIDE: 600; 310; 30; 20 INJECTION, SOLUTION INTRAVENOUS at 05:50

## 2022-06-11 RX ADMIN — ACETAMINOPHEN 1000 MG: 500 TABLET, FILM COATED ORAL at 05:53

## 2022-06-11 RX ADMIN — CELECOXIB 200 MG: 200 CAPSULE ORAL at 05:54

## 2022-06-11 RX ADMIN — FAMOTIDINE 20 MG: 20 TABLET, FILM COATED ORAL at 05:52

## 2022-06-11 RX ADMIN — OXYCODONE 5 MG: 5 TABLET ORAL at 05:54

## 2022-06-11 ASSESSMENT — ENCOUNTER SYMPTOMS
HEADACHES: 0
DOUBLE VISION: 0
DIZZINESS: 0
PSYCHIATRIC NEGATIVE: 1
RESPIRATORY NEGATIVE: 1
CONSTITUTIONAL NEGATIVE: 1
EYES NEGATIVE: 1
NAUSEA: 0
ABDOMINAL PAIN: 1
CARDIOVASCULAR NEGATIVE: 1
NEUROLOGICAL NEGATIVE: 1
VOMITING: 0
MYALGIAS: 1
BLURRED VISION: 0
FOCAL WEAKNESS: 0

## 2022-06-11 ASSESSMENT — COGNITIVE AND FUNCTIONAL STATUS - GENERAL
DAILY ACTIVITIY SCORE: 24
SUGGESTED CMS G CODE MODIFIER MOBILITY: CH
SUGGESTED CMS G CODE MODIFIER DAILY ACTIVITY: CH
MOBILITY SCORE: 24

## 2022-06-11 ASSESSMENT — PATIENT HEALTH QUESTIONNAIRE - PHQ9
1. LITTLE INTEREST OR PLEASURE IN DOING THINGS: NOT AT ALL
2. FEELING DOWN, DEPRESSED, IRRITABLE, OR HOPELESS: NOT AT ALL
SUM OF ALL RESPONSES TO PHQ9 QUESTIONS 1 AND 2: 0

## 2022-06-11 ASSESSMENT — COPD QUESTIONNAIRES
COPD SCREENING SCORE: 0
HAVE YOU SMOKED AT LEAST 100 CIGARETTES IN YOUR ENTIRE LIFE: NO/DON'T KNOW
DO YOU EVER COUGH UP ANY MUCUS OR PHLEGM?: NO/ONLY WITH OCCASIONAL COLDS OR INFECTIONS
DURING THE PAST 4 WEEKS HOW MUCH DID YOU FEEL SHORT OF BREATH: NONE/LITTLE OF THE TIME

## 2022-06-11 ASSESSMENT — FIBROSIS 4 INDEX: FIB4 SCORE: 1.76

## 2022-06-11 ASSESSMENT — PAIN DESCRIPTION - PAIN TYPE
TYPE: ACUTE PAIN
TYPE: ACUTE PAIN

## 2022-06-11 NOTE — ASSESSMENT & PLAN NOTE
Chronic condition treated with metformin.  Holding maintenance metformin for 48 hours following intravenous contrast administration.  Insulin sliding scale coverage.     DATE OF VISIT: 7/18/2018      REASON FOR VISIT:  Squamous cell cancer of left lung, stage IV , Anemia      HISTORY OF PRESENT ILLNESS:    57-year-old male with a past medical history significant for duodenal ulcer, extensive nicotine addiction with more than 45-pack-year smoking history, history of alcohol abuse was seen in consultation on May 3, 2017 for newly diagnosed stage IIB squamous cell cancer of Of left lung.  Patient finished concurrent chemoradiation on July 17, 2017.  Patient had a CT of chest done on December 1, 2017 showed recurrence involving left chest wall region making it stage IV.  In view of PDL 1 showing positivity about 20%, patient was started on Keytruda January 10, 2018.  Patient is here to get cycle 10 of Keytruda today.   Patient had a CT of chest, abdomen and pelvis with contrast done on July 17, 2018, is here to discuss result of CT scan and further recommendation.  States his appetite is improved.  Denies any new lymph node enlargement.   Denies any bleeding.  Denies any fever or new cough.  Denies any diarrhea or any new skin lesion.      PAST MEDICAL HISTORY:    Past Medical History:   Diagnosis Date   • Anemia    • Aortic stenosis, mild    • Arthritis    • Chronic bronchitis (CMS/HCC)    • Duodenal ulcer disease    • Inguinal hernia, left    • Lung cancer (CMS/HCC)    • Mitral valve regurgitation        SOCIAL HISTORY:    Social History   Substance Use Topics   • Smoking status: Current Every Day Smoker     Packs/day: 1.00     Years: 40.00     Types: Cigarettes   • Smokeless tobacco: Former User      Comment: Reduced at half a pack per day and weanning off   • Alcohol use 8.4 oz/week     14 Cans of beer per week      Comment: 6 pk/dly       Surgical History :  Past Surgical History:   Procedure Laterality Date   • APPENDECTOMY      open   • BRONCHOSCOPY N/A 3/8/2017    Procedure: BRONCHOSCOPY with possible bronchoalveolar lavage, +/- brush, +/- needle biopsy, +/- endobronchial  "biopsy;  Surgeon: Candis Lr MD;  Location: [facility] ENDOSCOPY;  Service:    • EXPLORATORY LAPAROTOMY  11/17/2014    Exploratory laparotomy, repair of duodenal ulcer, modified Carlos patch   • INGUINAL HERNIA REPAIR  09/13/2013    Open repair of an indirect left inguinal hernia. Left inguinal hernia.   • UT INSJ TUNNELED CVC W/O SUBQ PORT/ AGE 5 YR/> N/A 5/10/2017    Procedure: TUNNELED CENTRAL VENOUS CATHETER  WITH SUBCUTANEOUS  PORT,FLUOROSCOPIC GUIDANCE ;  Surgeon: Angel Hernandez MD;  Location: [facility] OR;  Service: Cardiothoracic   • THORACOTOMY Left 4/19/2017    Procedure: THORACOTOMY LEFT WITH PULMONARY RESECTION AND BRONCHOSCOPY WITH ENDO ;  Surgeon: Angel Hernandez MD;  Location: [facility] OR;  Service:        ALLERGIES:    No Known Allergies    FAMILY HISTORY:  Family History   Problem Relation Age of Onset   • Asthma Father    • Cancer Father    • Diabetes Father    • Hypertension Father    • Alcohol abuse Mother    • Heart failure Brother          REVIEW OF SYSTEMS:      CONSTITUTIONAL:  Positive for fatigue, denies any recent worsening.States his appetite is improved. No recent weight loss.Denies any fever, chills .      HEENT: No epistaxis, mouth sores or difficulty swallowing.     RESPIRATORY: Denies any shortness of breath or cough today.     CARDIOVASCULAR: States pain in left chest wall gets worse with activity.  No palpitation.     GASTROINTESTINAL: No abdominal pain nausea, vomiting or blood in the stool.     GENITOURINARY: No Dysuria or Hematuria.     MUSCULOSKELETAL: No new back pain or arthralgia..     LYMPHATICS: Denies any abnormal swollen glands anywhere in the body.     NEUROLOGICAL : No tingling or numbness. No headache or dizziness. No seizures or balance problems.     SKIN: No new skin lesions.           PHYSICAL EXAMINATION:      VITAL SIGNS:  /77   Pulse 95   Temp 98 °F (36.7 °C) (Temporal Artery )   Resp 18   Ht 172.2 cm (67.8\")   Wt 58.4 kg (128 lb 12.8 " oz)   SpO2 98%   BMI 19.70 kg/m²      ECOG performance status : 1    GENERAL:  Not in any distress.    HEENT:  Normocephalic, Atraumatic.Mild Conjunctival pallor. No icterus. Extraocular Movements Intact. No Facial Asymmetry noted.    NECK:  No adenopathy. No JVD.    RESPIRATORY:  Fair air entry bilateral. No rhonchi or wheezing.  Mild erythema of left chest wall.    CARDIOVASCULAR:  S1, S2. Regular rate and rhythm. No murmur or gallop appreciated.    ABDOMEN:  Soft,  nontender. Bowel sounds present in all four quadrants.  No hepatosplenomegaly appreciated.    MUSCULOSKELTAL:  No edema.No Calf Tenderness.Normal range of motion.    NEUROLOGIC:  Alert, awake and oriented ×3.  No  Motor or sensory deficit appreciated. Cranial Nerves 2-12 grossly intact.    SKIN: No new skin lesions    LYMPHATICS: No new enlarged lymph node in neck or supra-clavicular area.            DIAGNOSTIC DATA:    Glucose   Date Value Ref Range Status   07/18/2018 87 60 - 100 mg/dL Final     Glucose, Arterial   Date Value Ref Range Status   04/19/2017 176 mmol/L Final     Sodium   Date Value Ref Range Status   07/18/2018 139 137 - 145 mmol/L Final     Potassium   Date Value Ref Range Status   07/18/2018 3.9 3.5 - 5.1 mmol/L Final     CO2   Date Value Ref Range Status   07/18/2018 24.0 22.0 - 31.0 mmol/L Final     Chloride   Date Value Ref Range Status   07/18/2018 108 95 - 110 mmol/L Final     Anion Gap   Date Value Ref Range Status   07/18/2018 7.0 5.0 - 15.0 mmol/L Final     Creatinine   Date Value Ref Range Status   07/18/2018 0.43 (L) 0.70 - 1.30 mg/dL Final     BUN   Date Value Ref Range Status   07/18/2018 11 7 - 21 mg/dL Final     BUN/Creatinine Ratio   Date Value Ref Range Status   07/18/2018 25.6 (H) 7.0 - 25.0 Final     Calcium   Date Value Ref Range Status   07/18/2018 8.7 8.4 - 10.2 mg/dL Final     eGFR Non  Amer   Date Value Ref Range Status   07/18/2018 204 (H) 56 - 130 mL/min/1.73 Final     Alkaline Phosphatase   Date  Value Ref Range Status   07/18/2018 70 38 - 126 U/L Final     Total Protein   Date Value Ref Range Status   07/18/2018 6.9 6.3 - 8.6 g/dL Final     ALT (SGPT)   Date Value Ref Range Status   07/18/2018 21 21 - 72 U/L Final     AST (SGOT)   Date Value Ref Range Status   07/18/2018 23 17 - 59 U/L Final     Total Bilirubin   Date Value Ref Range Status   07/18/2018 0.3 0.2 - 1.3 mg/dL Final     Albumin   Date Value Ref Range Status   07/18/2018 3.90 3.40 - 4.80 g/dL Final     Globulin   Date Value Ref Range Status   07/18/2018 3.0 2.3 - 3.5 gm/dL Final     Lab Results   Component Value Date    WBC 7.90 07/18/2018    HGB 12.2 (L) 07/18/2018    HCT 35.7 (L) 07/18/2018    MCV 93.9 07/18/2018     07/18/2018     Lab Results   Component Value Date    NEUTROABS 5.65 07/18/2018    IRON 27 (L) 06/06/2018    TIBC 298 06/06/2018    LABIRON 9 (L) 06/06/2018    FERRITIN 120.00 06/06/2018    ODPOOORF05 620 06/06/2018    FOLATE >20.00 06/06/2018         PATHOLOGY:  Foundation 1 testing done on 26 December 2017 showed:  Genomic alteration identified includes:   PTEN loss  BCORL1 loos exon 8-12  CDKN@A/B loss  KMT@C(MLL3)G638  PRKC! Amplification  STAT4 truncation intron 9  TERC amplification  TP53 [id number]    No mutation identified in EGFR, KRAS, RET, ALK,MET, ERBB2, BRAF, ROS1          PDL 1 testing on tumor shows 20% positivity.          Pathology :  Pathology Report from April 19, 2017 showed:       SPECIMEN   Specimen   Lobe(s) of lung   Lobes of Lung   Upper lobe   Procedure   Lobectomy   Specimen Laterality   Left   TUMOR   Primary Tumor Site   Upper lobe   Histologic Type   Squamous cell carcinoma   Histologic Grade   G2: Moderately differentiated   Tumor Size   Greatest dimension (cm): 8 cm   Additional Dimension (cm)   7 cm       5 cm   Tumor Focality   Unifocal   Visceral Pleura Invasion   Present   Tumor Extension   Parietal pleura   Lymph-Vascular Invasion   Not identified   MARGINS   Bronchial Margin   Uninvolved by  invasive carcinoma and carcinoma in situ   Vascular Margin   Uninvolved by invasive carcinoma   Parenchymal Margin   Involved by invasive carcinoma   LYMPH NODES   Regional Lymph Nodes       Number of Lymph Nodes Examined   Specify number: 6   Gene Stations Examined   5: Subaortic/ aortopulmonary (AP) / AP window       11L: Interlobar   Lymph Node Involvement   No nodes involved   STAGE (pTNM)   Primary Tumor (pT)   pT3: Tumor greater than 7 cm in greatest dimension; or Tumor of any size that directly invades any of the following: parietal pleural chest wall (including superior sulcus tumors), diaphragm, phrenic nerve, mediastinal pleura, parietal pericardium; or Tumor of any size in the main bronchus less than 2 cm distal to the petar but without involvement of the petar; or Tumor of any size associated with atelectasis or obstructive pneumonitis of the entire lung; or Tumors of any size with separate tmor nodule(s) in same lobe   Regional Lymph Nodes (pN)   pN0: No regional lymph node metastasis   ADDITIONAL FINDINGS   Additional Pathologic Finding(s)   Other (specify): Fungal hyphae consistent with Aspergillus species in the cavity contents   .          RADIOLOGY DATA :  CT of chest, abdomen and pelvis with contrast done on May 9, 2018 was reviewed, discussed with patient, it showed:  IMPRESSION:  CONCLUSION: Evidence of prior left upper lobectomy. Loss of  volume and shift the heart and mediastinum to the left. Left  upper lobe infiltrative changes with bronchiectasis. This is  probably post therapeutic, radiation changes. This is similar to  CT of March 8, 2018, but increased since PET/CT examination.  Chronic pleural thickening left apex and destructive lesions left  anterior upper ribs unchanged since prior PET/CT and prior CT  examination probably a combination of radiation necrosis left  ribs and treated metastases.     Emphysematous changes right lung. New subtle tree-in-bud type  infiltrative changes  right upper lobe right lower lobe.  Micronodules seen on prior CT examination the right lung are not  identified on today's exam.     Degenerative changes thoracic and lumbar spine. 1 cm anterior  spondylolisthesis of L5 with respect to S1 secondary to facet  arthrosis and pars defects, spondylolysis. CT abdomen pelvis  otherwise remarkable. No evidence of any metastatic disease in  the abdomen or pelvis.        CT of chest, abdomen and pelvis with contrast done on March 8, 2018 was reviewed with radiologist Dr. Vences, discussed with patient, it showed:  IMPRESSION:  1. Postoperative changes from left upper lobectomy with volume  loss and associated post treatment/radiation changes in the left  lung apex. There is new posterior airspace disease in the extreme  left apex which is probably related to prior radiation therapy,  less likely pneumonia. Small scattered pulmonary nodules in the  left lung apex are partially obscured by this airspace disease  and the progression of volume loss although these appear grossly  stable.  2. Many of the small 2 to 3 mm pulmonary nodules in the right  lung are unchanged. There are three new small nodules described  above which are indeterminate and attention on follow-up is  recommended.  3. 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 However, there may also be a component of rib destruction  due to tumor. There is increased thickening of the soft tissues  of the chest wall this location. The soft tissue thickening has  progressed with small areas of decreased attenuation are noted in  the soft tissue. These could represent areas of necrosis or  fluid. This could be in part due to a post radiation therapy  changes although when correlating with the PET/CT and PET/CT  report, this is also suspicious for tumor extension or  involvement of the chest wall.   4. No convincing evidence of metastatic disease involving the  abdomen or pelvis.    PET CT done on  December 29, 2017 was reviewed, discussed with patient, it showed:  IMPRESSION:  CONCLUSION:  1.  Mildly enlarging left lung apex pleural base mass lesion with  abnormal FDG activity suspicious for Pancoast tumor which is  invading into the anterior chest wall and partially eroding  through the anterior left second third and fourth ribs. No  current evidence of distant metastatic disease.         PET/CT done on May 22, 2017 showed:  IMPRESSION:  CONCLUSION:  1. Large Pancoast tumor involving left apex with significant  involvement of the periphery, pleura, portions of the chest wall  and contiguous involvement, destruction of at least one of the  anterior left upper ribs.     PET/CT imaging study is otherwise unremarkable. No evidence of  any distant metastases.          ASSESSMENT AND PLAN:      1.  Squamous cell cancer of left upper lobe, stage IIB, T3 N0 M0: Patient is status post surgical excision with positive pleural margin.  As per NCCN guidelines patient received concurrent chemoradiation with weekly carboplatin and Taxol.  Patient received concurrent chemoradiation with  weekly carboplatin and Taxol with radiation  From June 05,2017 until 07/17/2017.  CT scan done on December 1, 2017 shows enlarged left-sided precarinal lymph node as well as thickening of the left pleura eroding into the left rib cage worrisome for recurrence of cancer.  CT done on December 29, 2017 shows left chest wall recurrence with involvement of pleura, left treat this without any evidence of distance metastasis.  His cancer is stage IV because of the involvement of chest wall and pleura and ribs. PDl 1 testing was done which showed small positivity of 20%.  Foundation 1 testing showed positivity for PTEN loss which Everolimus can be used.  All other mutation that shows positivity are currently , There is no other targeted FDA approved treatment can be used.  -Patient was started on treatment with Keytruda on January 10, 2018.   -CT  scan done on July 17, 2018 after cycle 9 of Keytruda showed stable disease involving left chest wall region.  There was no evidence of any new metastatic focus.  -We will go ahead with cycle 10 of Keytruda today.  Plan would be to repeat CT of chest, abdomen and pelvis with contrast after cycle 12 of Keytruda which was discussed with patient.    2.  Left rib involvement secondary to local recurrence and progression: Treatment with Xgeva was discussed with patient side effect of right jaw including osteonecrosis of jaw, hypocalcemia, mouth sores were discussed.  Patient does need full mouth extraction, he has not done it yet.  We will hold off Xgeva until he gets dental work done.  Importance of seeing dentist and getting dental work done was discussed with patient again today.    3.  Pain management: .  He is using Percocet 10/325 every 4 hours with moderate pain relief.   Patient was counseled about the importance of bowel regimen to prevent constipation.  Prescription for morphine sulfate 30 mg by mouth twice a day with 60 tablet has been provided  Today on 07/18/18.  Prescription for Percocet 10/325 with 180 tablets has been provided  on June 27, 2018.    4.  macrocytosis: Patient was found to have MCV of 93 today .  Patient does admit to drinking 2-3 beer on a daily basis.  He remains on vitamin B12 thousand micrograms by mouth daily.  Again it was recommended to stay away from alcohol completely.    5.  Anemia: Most likely anemia of chronic disease secondary to underlying malignancy.  Patient's hemoglobin is 12.2 today.  Anemia workup done recently  showed component of iron deficiency with iron saturation of 9%.  She has been started on ferrous sulfate 1 tablet by mouth daily.    6.  Hypertension: Patient's blood pressure is elevated at 165/89 today.  She is currently on amlodipine 5 mg by mouth daily.  Being followed by Dr. Stone .    7.Pulmonary aspergillosis: Patient had a fungus ball consistent with  Aspergillus on a cavitary lesion of lung status postsurgical excision.     8. History of duodenal ulcer     9. Health maintenance: Patient smoking intermittently, he was counseled about smoking cessation.  About 4 minutes were spent for smoking cessation counseling.       10.BMI:Patient's Body mass index is 19.7 kg/m². BMI is lower than reference range.  Patient was counseled about small frequent meals to prevent further weight loss.    11.  Advance care planning: For now patient remains full code and is able to make all his decision.  Patient has health care surrogate mentioned on chart.              Gualberto Dowd MD  7/18/2018  8:43 AM        EMR Dragon/Transcription disclaimer:   Much of this encounter note is an electronic transcription/translation of spoken language to printed text. The electronic translation of spoken language may permit erroneous, or at times, nonsensical words or phrases to be inadvertently transcribed; Although I have reviewed the note for such errors, some may still exist.

## 2022-06-11 NOTE — PROGRESS NOTES
Received report from previous shift RN.  Assessment complete.  Patient A&O x 4. Patient calls appropriately.  Patient ambulates with no assist.   Patient has 5/10 pain. Pain managed with prescribed medications.  Denies N&V. Pt NPO.  LR running at 125 mL/hr.  + void, + flatus, pta BM.  Patient on RA, denies SOB.     Reviewed plan of care with patient. Call light and personal belongings within reach. Hourly rounding in place. All needs met at this time.

## 2022-06-11 NOTE — PROGRESS NOTES
4 Eyes Skin Assessment Completed by FLORENCIO Garcia and Lupe RN.    Head WDL  Ears WDL  Nose WDL  Mouth WDL  Neck WDL  Breast/Chest WDL  Shoulder Blades WDL  Spine WDL  (R) Arm/Elbow/Hand Bruising and Abrasion to R hand    (L) Arm/Elbow/Hand WDL  Abdomen Scar healed from previous surgeries  Groin WDL  Scrotum/Coccyx/Buttocks WDL  (R) Leg WDL  (L) Leg Bruising L hip bruise  (R) Heel/Foot/Toe WDL  (L) Heel/Foot/Toe WDL          Devices In Places Blood Pressure Cuff and Pulse Ox      Interventions In Place Pillows and Pressure Redistribution Mattress    Possible Skin Injury No    Pictures Uploaded Into Epic N/A  Wound Consult Placed N/A  RN Wound Prevention Protocol Ordered No
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
## 2022-06-11 NOTE — ED NOTES
Med rec completed per patient at bedside.  Allergies reviewed with patient.  No outpatient antibiotics in the last 30 days.  Patient's preferred pharmacy: [facility] on [address]    Patient unsure of the strengths of most of her vitamins/supplements.

## 2022-06-11 NOTE — ASSESSMENT & PLAN NOTE
Admission CT imaging demonstrated trace left perisylvian traumatic subarachnoid hemorrhage.  Big 1 classification of hemorrhage.  Nonoperative management.  No repeat imaging warranted.
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
## 2022-06-11 NOTE — CARE PLAN
The patient is Stable - Low risk of patient condition declining or worsening    Shift Goals  Clinical Goals: pain management  Patient Goals: go home/no surgery    Progress made toward(s) clinical / shift goals:  Pt's 5/10 pain managed with PRN pain medications and rest. Pain medications available discussed with pt. Bed locked and in lowest position, call light and belongings within reach, pt resting comfortably in bed and able to sleep throughout the night, all needs met at this time.   Problem: Knowledge Deficit - Standard  Goal: Patient and family/care givers will demonstrate understanding of plan of care, disease process/condition, diagnostic tests and medications  Outcome: Progressing     Problem: Pain - Standard  Goal: Alleviation of pain or a reduction in pain to the patient’s comfort goal  Outcome: Progressing       Patient is not progressing towards the following goals:

## 2022-06-11 NOTE — DISCHARGE PLANNING
Meds-to-Beds: Discharge prescription orders listed below delivered to patient's bedside. RN Sheila notified. Patient counseled.      Patient elected to have co-payment billed to patient account.     Current Outpatient Medications   Medication Sig Dispense Refill   • celecoxib (CELEBREX) 200 MG Cap Take 1 Capsule by mouth 2 times a day for 5 days. 10 Capsule 0      Brendan Ackerman
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
## 2022-06-11 NOTE — DISCHARGE INSTRUCTIONS
- Call or seek medical attention for questions or concerns  - Follow up with Dr. Pike in next week time  - Follow up with primary care provider within one weeks time  - Resume regular diet  - May take over the counter acetaminophen or ibuprofen as needed for pain  - Continue daily over the counter stool softener while on narcotics  - No operation of machinery or motorized vehicles while under the influence of narcotics  - No alcohol, marijuana or illicit drug use while under the influence of narcotics  - In the event of a narcotic overdose naloxone (Narcan) is available without a prescription from any [facility] or [facility]  - No swimming, hot tubs, baths or wound submersion until cleared by outpatient provider. May shower  - No contact sports, strenuous activities, or heavy lifting until cleared by outpatient provider  - If respiratory decompensation, persistent or worsening pain, or signs or symptoms of infection occur seek medical attention  Discharge Instructions    Discharged to home by car with friend. Discharged via wheelchair, hospital escort: Yes.  Special equipment needed: Not Applicable    Be sure to schedule a follow-up appointment with your primary care doctor or any specialists as instructed.     Discharge Plan:   Diet Plan: Discussed  Activity Level: Discussed  Confirmed Follow up Appointment: Patient to Call and Schedule Appointment  Confirmed Symptoms Management: Discussed  Medication Reconciliation Updated: Yes    I understand that a diet low in cholesterol, fat, and sodium is recommended for good health. Unless I have been given specific instructions below for another diet, I accept this instruction as my diet prescription.   Other diet:     Special Instructions: None    Is patient discharged on Warfarin / Coumadin?   No     Subarachnoid Hemorrhage    Subarachnoid hemorrhage is bleeding between the brain and the layer that covers the brain. The bleeding puts pressure on the brain, and it  stops blood from going to some areas of the brain. If this bleeding is not treated, it may cause brain damage, stroke, or death. This is an emergency. You must be treated in the hospital right away.  You are more likely to get this condition if you:  Smoke.  Have high blood pressure.  Drink too much alcohol.  Are older than age 50.  Are female, especially if you have stopped getting your period for a year or longer (menopause).  Have a family history of burst blood vessels (aneurysms).  Have a certain syndrome that leads to one of these:  Kidney disease.  Disease of tissues like bones, blood, and fat (connective tissues).  Signs of this bleeding condition include:  Sudden, very bad headache. It may feel like the worst headache you have ever had.  Feeling sick to your stomach (nausea) or throwing up (vomiting), especially if you have other signs such as a headache.  Sudden weakness or loss of feeling (numbness) in your face, arm, or leg, especially on one side of the body.  Sudden trouble with any of these:  Walking.  Moving an arm or leg.  Talking.  Understanding what people say.  Swallowing.  Seeing out of one eye or both eyes.  Sudden confusion.  Seeing double.  Loss of balance.  Sensitivity to light.  Stiff neck.  Trouble staying awake.  Passing out (fainting).  Follow these instructions at home:  Medicines  Take over-the-counter and prescription medicines only as told by your doctor.  Do not take any medicines that contain aspirin or NSAIDs (like ibuprofen) unless your doctor says that it is safe to take them.  Lifestyle  Do not use any products that have nicotine or tobacco. These include cigarettes and e-cigarettes. If you need help quitting, ask your doctor.  Limit alcohol to 1 drink a day for nonpregnant women and 2 drinks a day for men. One drink is equal to:  12 oz of beer.  5 oz of wine.  1½ oz of hard liquor.  Eating and drinking  Ask your doctor if it is safe for you to eat and drink. You may need tests  "to make sure that you can swallow safely (swallow studies).  Driving  Do not drive until your doctor says that it is safe to drive.  Do not drive or use heavy machinery while taking prescription pain medicine.  General instructions  Do therapy as recommended. This may include:  Physical therapy (PT).  Occupational therapy (OT).  Speech-language therapy.  Rest and limit activity as told by your doctor. Rest helps your brain to heal. Make sure you:  Get plenty of sleep.  Avoid activities that cause stress to your body or mind.  Check your blood pressure as told by your doctor. Write down your blood pressure.  Keep all follow-up visits as told by your doctors. This is important.  Contact a doctor if:  You have a stiff neck.  You have a cough.  You have a fever.  Get help right away if:  You have any signs of a stroke. \"BE FAST\" is an easy way to remember the main warning signs:  B - Balance. Signs are dizziness, sudden trouble walking, or loss of balance.  E - Eyes. Signs are trouble seeing or a sudden change in how you see.  F - Face. Signs are sudden weakness or loss of feeling of the face, or the face or eyelid drooping on one side.  A - Arms. Signs are weakness or loss of feeling in an arm. This happens suddenly and usually on one side of the body.  S - Speech. Signs are sudden trouble speaking, slurred speech, or trouble understanding what people say.  T - Time. Time to call emergency services. Write down what time symptoms started.  You have other signs of a stroke, such as:  A sudden, very bad headache with no known cause.  Feeling sick to your stomach.  Throwing up.  Jerky movements you cannot control (seizure).  These symptoms may be an emergency. Do not wait to see if the symptoms will go away. Get medical help right away. Call your local emergency services (911 in the [place] Do not drive yourself to the hospital.  Summary  Subarachnoid hemorrhage is bleeding in the brain. It is an emergency. You must be " treated in the hospital right away.  Follow instructions from your doctor about eating, resting, and taking medicines.  Do not take any medicines that contain aspirin or NSAIDs (like ibuprofen) unless your doctor says that it is safe to take them.  This information is not intended to replace advice given to you by your health care provider. Make sure you discuss any questions you have with your health care provider.  Document Released: 04/14/2014 Document Revised: 11/30/2018 Document Reviewed: 09/27/2018  [employer] Patient Education © 2020 [employer]      Depression / Suicide Risk    As you are discharged from this [facility] facility, it is important to learn how to keep safe from harming yourself.    Recognize the warning signs:  Abrupt changes in personality, positive or negative- including increase in energy   Giving away possessions  Change in eating patterns- significant weight changes-  positive or negative  Change in sleeping patterns- unable to sleep or sleeping all the time   Unwillingness or inability to communicate  Depression  Unusual sadness, discouragement and loneliness  Talk of wanting to die  Neglect of personal appearance   Rebelliousness- reckless behavior  Withdrawal from people/activities they love  Confusion- inability to concentrate     If you or a loved one observes any of these behaviors or has concerns about self-harm, here's what you can do:  Talk about it- your feelings and reasons for harming yourself  Remove any means that you might use to hurt yourself (examples: pills, rope, extension cords, firearm)  Get professional help from the community (Mental Health, Substance Abuse, psychological counseling)  Do not be alone:Call your Safe Contact- someone whom you trust who will be there for you.  Call your local CRISIS HOTLINE 109-4277 or 668-930-6818  Call your local Children's Mobile Crisis Response Team [place] (342) 514-2882 or www.Embrace  Call the toll free  National Suicide Prevention Hotlines   National Suicide Prevention Lifeline 021-185-SJIH (1337)  [facility] Line Network 800-SUICIDE (651-3629)

## 2022-06-11 NOTE — PROGRESS NOTES
Pt dc'd 1005. IV and monitor removed; monitor room notified. Pt left unit via ambulating with her friend. Personal belongings with pt when leaving unit. Pt given discharge instructions prior to leaving unit including where to  prescriptions and when to follow-up; verbalizes understanding. Copy of discharge instructions with pt and in the chart.
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
## 2022-06-11 NOTE — PROGRESS NOTES
NOC Coverage    Seen and examined at bedside  Vitals normal  Pain a bit better    No tenderness with palpation of the bilateral upper quadrants or gerardo-umbilical region  Tender over the bilateral lower quadrants, [id number]>>[id number]  Multiple abdominal scars from abdominoplasty and hernia repairs    Rationale for observation discussed with patient  Labs in am  No need for emergency surgery at this time    Chetan Lynch MD, FACS

## 2022-06-11 NOTE — PROGRESS NOTES
Assumed care at 0900. Bedside report received from NOC charge RN. Patient's chart and MAR reviewed. Pt complains of left hip and abdominal soreness at this time. Pt is A & O 4. Patient was updated on plan of care for the day. Questions answered and concerns addressed.  Pt denies any additional needs at this time. White board updated. Call light, phone and personal belongings within reach.

## 2022-06-18 ENCOUNTER — HOSPITAL ENCOUNTER (OUTPATIENT)
Dept: LAB | Facility: MEDICAL CENTER | Age: 62
End: 2022-06-18
Attending: FAMILY MEDICINE
Payer: COMMERCIAL

## 2022-06-18 LAB
ALBUMIN SERPL BCP-MCNC: 4.3 G/DL (ref 3.2–4.9)
ALBUMIN/GLOB SERPL: 1.2 G/DL
ALP SERPL-CCNC: 87 U/L (ref 30–99)
ALT SERPL-CCNC: 24 U/L (ref 2–50)
ANION GAP SERPL CALC-SCNC: 11 MMOL/L (ref 7–16)
AST SERPL-CCNC: 20 U/L (ref 12–45)
BASOPHILS # BLD AUTO: 0.7 % (ref 0–1.8)
BASOPHILS # BLD: 0.05 K/UL (ref 0–0.12)
BILIRUB SERPL-MCNC: 0.7 MG/DL (ref 0.1–1.5)
BUN SERPL-MCNC: 13 MG/DL (ref 8–22)
CALCIUM SERPL-MCNC: 9.6 MG/DL (ref 8.5–10.5)
CHLORIDE SERPL-SCNC: 104 MMOL/L (ref 96–112)
CHOLEST SERPL-MCNC: 265 MG/DL (ref 100–199)
CO2 SERPL-SCNC: 26 MMOL/L (ref 20–33)
CREAT SERPL-MCNC: 0.52 MG/DL (ref 0.5–1.4)
CREAT UR-MCNC: 205.99 MG/DL
EOSINOPHIL # BLD AUTO: 0.12 K/UL (ref 0–0.51)
EOSINOPHIL NFR BLD: 1.6 % (ref 0–6.9)
ERYTHROCYTE [DISTWIDTH] IN BLOOD BY AUTOMATED COUNT: 45.8 FL (ref 35.9–50)
EST. AVERAGE GLUCOSE BLD GHB EST-MCNC: 214 MG/DL
FASTING STATUS PATIENT QL REPORTED: NORMAL
GFR SERPLBLD CREATININE-BSD FMLA CKD-EPI: 105 ML/MIN/1.73 M 2
GLOBULIN SER CALC-MCNC: 3.6 G/DL (ref 1.9–3.5)
GLUCOSE SERPL-MCNC: 191 MG/DL (ref 65–99)
HBA1C MFR BLD: 9.1 % (ref 4–5.6)
HCT VFR BLD AUTO: 42.9 % (ref 37–47)
HDLC SERPL-MCNC: 38 MG/DL
HGB BLD-MCNC: 13.7 G/DL (ref 12–16)
IMM GRANULOCYTES # BLD AUTO: 0.06 K/UL (ref 0–0.11)
IMM GRANULOCYTES NFR BLD AUTO: 0.8 % (ref 0–0.9)
LDLC SERPL CALC-MCNC: 159 MG/DL
LYMPHOCYTES # BLD AUTO: 2.32 K/UL (ref 1–4.8)
LYMPHOCYTES NFR BLD: 30.5 % (ref 22–41)
MCH RBC QN AUTO: 29.4 PG (ref 27–33)
MCHC RBC AUTO-ENTMCNC: 31.9 G/DL (ref 33.6–35)
MCV RBC AUTO: 92.1 FL (ref 81.4–97.8)
MICROALBUMIN UR-MCNC: 2 MG/DL
MICROALBUMIN/CREAT UR: 10 MG/G (ref 0–30)
MONOCYTES # BLD AUTO: 0.47 K/UL (ref 0–0.85)
MONOCYTES NFR BLD AUTO: 6.2 % (ref 0–13.4)
NEUTROPHILS # BLD AUTO: 4.58 K/UL (ref 2–7.15)
NEUTROPHILS NFR BLD: 60.2 % (ref 44–72)
NRBC # BLD AUTO: 0 K/UL
NRBC BLD-RTO: 0 /100 WBC
PLATELET # BLD AUTO: 352 K/UL (ref 164–446)
PMV BLD AUTO: 9.9 FL (ref 9–12.9)
POTASSIUM SERPL-SCNC: 4.3 MMOL/L (ref 3.6–5.5)
PROT SERPL-MCNC: 7.9 G/DL (ref 6–8.2)
RBC # BLD AUTO: 4.66 M/UL (ref 4.2–5.4)
SODIUM SERPL-SCNC: 141 MMOL/L (ref 135–145)
TRIGL SERPL-MCNC: 338 MG/DL (ref 0–149)
WBC # BLD AUTO: 7.6 K/UL (ref 4.8–10.8)

## 2022-06-18 PROCEDURE — 82570 ASSAY OF URINE CREATININE: CPT

## 2022-06-18 PROCEDURE — 85025 COMPLETE CBC W/AUTO DIFF WBC: CPT

## 2022-06-18 PROCEDURE — 82043 UR ALBUMIN QUANTITATIVE: CPT

## 2022-06-18 PROCEDURE — 80053 COMPREHEN METABOLIC PANEL: CPT

## 2022-06-18 PROCEDURE — 36415 COLL VENOUS BLD VENIPUNCTURE: CPT

## 2022-06-18 PROCEDURE — 83036 HEMOGLOBIN GLYCOSYLATED A1C: CPT

## 2022-06-18 PROCEDURE — 80061 LIPID PANEL: CPT

## 2022-07-27 ENCOUNTER — HOSPITAL ENCOUNTER (OUTPATIENT)
Dept: RADIOLOGY | Facility: MEDICAL CENTER | Age: 62
End: 2022-07-27
Attending: FAMILY MEDICINE
Payer: COMMERCIAL

## 2022-07-27 DIAGNOSIS — Z00.00 ENCOUNTER FOR GENERAL ADULT MEDICAL EXAMINATION WITHOUT ABNORMAL FINDINGS: ICD-10-CM

## 2022-07-27 PROCEDURE — 77063 BREAST TOMOSYNTHESIS BI: CPT

## 2022-10-12 ENCOUNTER — OFFICE VISIT (OUTPATIENT)
Dept: URGENT CARE | Facility: CLINIC | Age: 62
End: 2022-10-12
Payer: COMMERCIAL

## 2022-10-12 VITALS
HEIGHT: 65 IN | RESPIRATION RATE: 18 BRPM | HEART RATE: 66 BPM | SYSTOLIC BLOOD PRESSURE: 130 MMHG | BODY MASS INDEX: 25.19 KG/M2 | WEIGHT: 151.2 LBS | TEMPERATURE: 99.2 F | OXYGEN SATURATION: 97 % | DIASTOLIC BLOOD PRESSURE: 80 MMHG

## 2022-10-12 DIAGNOSIS — H57.89 RED EYE: ICD-10-CM

## 2022-10-12 PROCEDURE — 99213 OFFICE O/P EST LOW 20 MIN: CPT | Performed by: NURSE PRACTITIONER

## 2022-10-12 ASSESSMENT — FIBROSIS 4 INDEX: FIB4 SCORE: 0.72

## 2022-10-12 ASSESSMENT — VISUAL ACUITY: OU: 1

## 2022-10-12 NOTE — PROGRESS NOTES
"Chioma Yanez is a 62 y.o. female who presents for Eye Drainage (X 1 month Left eye red/drainage/)      HPI  This is a new problem. Chioma Yanez is a 62 y.o. patient who presents to urgent care with c/o: redness in left eye for 1 month.  Eye feels dry. Wears glasses. Does not wear contact lenses. Vision is normal for her.  She was just seen by her eye doctor about 1 month ago. Mentioned her eye redness at that time but they told her that her eye was healthy.   Previous had surgery to left eye secondary to trauma.   Treatments tried: \" get the red out\" OTC eye drops - gives temporary relief of red eye  Denies vision change, eye pain, eye drainage.    No other aggravating or alleviating factors.       ROS See HPI    Allergies:       Allergies   Allergen Reactions    Codeine Shortness of Breath    Codeine Shortness of Breath     Has tolerated morphine in the past       PMSFS Hx:  Past Medical History:   Diagnosis Date    Arthritis     Palpitations      Past Surgical History:   Procedure Laterality Date    KNEE ARTHROSCOPY Left 12/2/2015    Procedure: KNEE ARTHROSCOPY;  Surgeon: Edwin Simeon M.D.;  Location: [facility];  Service:     MENISCECTOMY Left 12/2/2015    Procedure: MENISCECTOMY partial lateral ;  Surgeon: Edwin Simeon M.D.;  Location: [facility];  Service:     CHONDROPLASTY Left 12/2/2015    Procedure: CHONDROPLASTY;  Surgeon: Edwin Simeon M.D.;  Location: [facility];  Service:     AR REPAIR EYE BLOWOUT,PERIORB+IMPLNT  2006    Left eye fracture repair    AR C-SEC ONLY,PREV C-SEC  1981     Family History   Problem Relation Age of Onset    Heart Failure Father     Diabetes Other      Social History     Tobacco Use    Smoking status: Never    Smokeless tobacco: Never   Substance Use Topics    Alcohol use: Never       Problems:   Patient Active Problem List   Diagnosis    Palpitations    Vitamin d deficiency    Chest discomfort    Shortness of " breath    Complex tear of lateral meniscus as current injury    Trauma    Blunt abdominal trauma, initial encounter    Contraindication to anticoagulation therapy    Subarachnoid hemorrhage following injury, no loss of consciousness (HCC)    Type 2 diabetes mellitus (HCC)       Medications:   Current Outpatient Medications on File Prior to Visit   Medication Sig Dispense Refill    Naproxen Sodium 220 MG Cap Take 440 mg by mouth 1 time a day as needed (Mild to Moderate Pain). 2 capsules = 440 mg.      cetirizine (ZYRTEC) 10 MG Tab Take 10 mg by mouth every day.      Potassium Citrate 99 MG Cap Take 198 mg by mouth every day. 2 capsules = 198 mg.      BIOTIN PO Take 2 Tablets by mouth every day.      metFORMIN (GLUCOPHAGE) 500 MG Tab Take 500 mg by mouth every day.      Omega-3 Fatty Acids (FISH OIL PO) Take 2 Capsules by mouth every day.      CALCIUM PO Take 1 Tablet by mouth every day.      MAGNESIUM PO Take 1 Tablet by mouth every day.      ZINC SULFATE PO Take 1 Tablet by mouth every day.      BENFOTIAMINE PO Take 1 Capsule by mouth every day.      Ferrous Sulfate (IRON PO) Take 1 Tablet by mouth every day.      Cyanocobalamin (VITAMIN B-12 PO) Take 1 Tablet by mouth every day.      SELENIUM PO Take 2 Tablets by mouth every day.      Cholecalciferol (VITAMIN D3 PO) Take 1 tablet by mouth every day.      cyanocobalamin (VITAMIN B-12) 100 MCG Tab Take 100 mcg by mouth every day.      Flaxseed, Linseed, (FLAX SEED OIL PO) Take 1 Cap by mouth every day.      ibuprofen (MOTRIN) 200 MG Tab Take 200 mg by mouth every 6 hours as needed for Mild Pain or Inflammation.      B Complex Vitamins (VITAMIN B COMPLEX PO) Take 1 Capsule by mouth every day.      ascorbic acid (ASCORBIC ACID) 500 MG Tab Take 500 mg by mouth every day.      Calcium-Magnesium-Zinc (MICAELA-MAG-ZINC PO) Take 1 Capsule by mouth every day.      ONETOUCH VERIO strip USE TWICE A DAY      acetaminophen (TYLENOL) 325 MG Tab Take 2 Tablets by mouth every 6 hours  "as needed for Mild Pain. (Patient not taking: Reported on 10/12/2022)      bacitracin-polymyxin b (POLYSPORIN) 500-61185 UNIT/GM Ointment Apply  topically 3 times a day. (Patient not taking: Reported on 10/12/2022)  0    erythromycin 5 MG/GM Ointment Pt to apply 1/4 inch ribbon to lid of affected eye 2- 3 times daily x 5 days. (Patient not taking: Reported on 10/12/2022) 3.5 g 0    metFORMIN (GLUCOPHAGE) 500 MG Tab Take 500 mg by mouth every day. (Patient not taking: Reported on 10/12/2022)       No current facility-administered medications on file prior to visit.          Objective:     /80 (BP Location: Left arm, Patient Position: Sitting)   Pulse 66   Temp 37.3 °C (99.2 °F) (Temporal)   Resp 18   Ht 1.638 m (5' 4.5\")   Wt 68.6 kg (151 lb 3.2 oz)   SpO2 97%   BMI 25.55 kg/m²     Physical Exam  Vitals reviewed.   Constitutional:       Appearance: She is well-developed.   Eyes:      General: Lids are normal. Vision grossly intact. No visual field deficit.        Left eye: Discharge present.     Extraocular Movements: Extraocular movements intact.      Right eye: Normal extraocular motion and no nystagmus.      Left eye: Normal extraocular motion and no nystagmus.      Conjunctiva/sclera:      Left eye: Left conjunctiva is injected. No exudate or hemorrhage.     Pupils: Pupils are equal, round, and reactive to light.      Right eye: No corneal abrasion or fluorescein uptake.      Slit lamp exam:     Right eye: No corneal flare, corneal ulcer, foreign body or anterior chamber bulge.   Cardiovascular:      Rate and Rhythm: Normal rate.   Pulmonary:      Effort: Pulmonary effort is normal.   Musculoskeletal:         General: Normal range of motion.      Cervical back: Normal range of motion.   Skin:     General: Skin is warm and dry.   Neurological:      Mental Status: She is alert and oriented to person, place, and time.   Psychiatric:         Speech: Speech normal.         Behavior: Behavior normal.       "   Thought Content: Thought content normal.         Judgment: Judgment normal.         Assessment /Associated Orders:      1. Red eye              Medical Decision Making:    Pt is clinically stable at today's acute urgent care visit.  No acute distress noted. Appropriate for outpatient care at this time.   Acute problem today .   Referral back to optometry   Liquid tears. Dosage and directions per       Discussed Dx, management options (risks,benefits, and alternatives to planned treatment), natural progression and supportive care.  Expressed understanding and the treatment plan was agreed upon.   Questions were encouraged and answered   Return to urgent care prn if new or worsening sx or if there is no improvement in condition prn.    Educated in Red flags and indications to immediately call 911 or present to the Emergency Department.           Please note that this dictation was created using voice recognition software. I have worked with consultants from the vendor as well as technical experts from [employer] [facility] [employer] to optimize the interface. I have made every reasonable attempt to correct obvious errors, but I expect that there are errors of grammar and possibly content that I did not discover before finalizing the note.  This note was electronically signed by provider

## 2022-11-02 ENCOUNTER — HOSPITAL ENCOUNTER (OUTPATIENT)
Dept: LAB | Facility: MEDICAL CENTER | Age: 62
End: 2022-11-02
Attending: FAMILY MEDICINE
Payer: COMMERCIAL

## 2022-11-02 LAB
25(OH)D3 SERPL-MCNC: 41 NG/ML (ref 30–100)
BASOPHILS # BLD AUTO: 1 % (ref 0–1.8)
BASOPHILS # BLD: 0.06 K/UL (ref 0–0.12)
CA-I SERPL-SCNC: 1.2 MMOL/L (ref 1.1–1.3)
CHOLEST SERPL-MCNC: 253 MG/DL (ref 100–199)
CK SERPL-CCNC: 65 U/L (ref 0–154)
CREAT UR-MCNC: 113.75 MG/DL
EOSINOPHIL # BLD AUTO: 0.06 K/UL (ref 0–0.51)
EOSINOPHIL NFR BLD: 1 % (ref 0–6.9)
ERYTHROCYTE [DISTWIDTH] IN BLOOD BY AUTOMATED COUNT: 48.1 FL (ref 35.9–50)
EST. AVERAGE GLUCOSE BLD GHB EST-MCNC: 232 MG/DL
FASTING STATUS PATIENT QL REPORTED: NORMAL
HBA1C MFR BLD: 9.7 % (ref 4–5.6)
HCT VFR BLD AUTO: 45.5 % (ref 37–47)
HDLC SERPL-MCNC: 38 MG/DL
HGB BLD-MCNC: 14.4 G/DL (ref 12–16)
IMM GRANULOCYTES # BLD AUTO: 0.05 K/UL (ref 0–0.11)
IMM GRANULOCYTES NFR BLD AUTO: 0.8 % (ref 0–0.9)
LDLC SERPL CALC-MCNC: 168 MG/DL
LYMPHOCYTES # BLD AUTO: 1.75 K/UL (ref 1–4.8)
LYMPHOCYTES NFR BLD: 28.5 % (ref 22–41)
MCH RBC QN AUTO: 29.1 PG (ref 27–33)
MCHC RBC AUTO-ENTMCNC: 31.6 G/DL (ref 33.6–35)
MCV RBC AUTO: 91.9 FL (ref 81.4–97.8)
MICROALBUMIN UR-MCNC: 1.3 MG/DL
MICROALBUMIN/CREAT UR: 11 MG/G (ref 0–30)
MONOCYTES # BLD AUTO: 0.46 K/UL (ref 0–0.85)
MONOCYTES NFR BLD AUTO: 7.5 % (ref 0–13.4)
NEUTROPHILS # BLD AUTO: 3.77 K/UL (ref 2–7.15)
NEUTROPHILS NFR BLD: 61.2 % (ref 44–72)
NRBC # BLD AUTO: 0 K/UL
NRBC BLD-RTO: 0 /100 WBC
PLATELET # BLD AUTO: 266 K/UL (ref 164–446)
PMV BLD AUTO: 10.4 FL (ref 9–12.9)
PTH-INTACT SERPL-MCNC: 34.3 PG/ML (ref 14–72)
RBC # BLD AUTO: 4.95 M/UL (ref 4.2–5.4)
TRIGL SERPL-MCNC: 233 MG/DL (ref 0–149)
TSH SERPL DL<=0.005 MIU/L-ACNC: 2.19 UIU/ML (ref 0.38–5.33)
WBC # BLD AUTO: 6.2 K/UL (ref 4.8–10.8)

## 2022-11-02 PROCEDURE — 82570 ASSAY OF URINE CREATININE: CPT

## 2022-11-02 PROCEDURE — 80061 LIPID PANEL: CPT

## 2022-11-02 PROCEDURE — 36415 COLL VENOUS BLD VENIPUNCTURE: CPT

## 2022-11-02 PROCEDURE — 82043 UR ALBUMIN QUANTITATIVE: CPT

## 2022-11-02 PROCEDURE — 83036 HEMOGLOBIN GLYCOSYLATED A1C: CPT

## 2022-11-02 PROCEDURE — 82306 VITAMIN D 25 HYDROXY: CPT

## 2022-11-02 PROCEDURE — 85025 COMPLETE CBC W/AUTO DIFF WBC: CPT

## 2022-11-02 PROCEDURE — 84443 ASSAY THYROID STIM HORMONE: CPT

## 2022-11-02 PROCEDURE — 83970 ASSAY OF PARATHORMONE: CPT

## 2022-11-02 PROCEDURE — 82330 ASSAY OF CALCIUM: CPT

## 2022-11-02 PROCEDURE — 82550 ASSAY OF CK (CPK): CPT

## 2023-09-12 ENCOUNTER — OFFICE VISIT (OUTPATIENT)
Dept: URGENT CARE | Facility: CLINIC | Age: 63
End: 2023-09-12
Payer: COMMERCIAL

## 2023-09-12 VITALS
BODY MASS INDEX: 23.9 KG/M2 | WEIGHT: 140 LBS | DIASTOLIC BLOOD PRESSURE: 84 MMHG | OXYGEN SATURATION: 95 % | HEIGHT: 64 IN | TEMPERATURE: 98.4 F | HEART RATE: 75 BPM | SYSTOLIC BLOOD PRESSURE: 126 MMHG | RESPIRATION RATE: 14 BRPM

## 2023-09-12 DIAGNOSIS — J01.90 ACUTE BACTERIAL SINUSITIS: ICD-10-CM

## 2023-09-12 DIAGNOSIS — B96.89 ACUTE BACTERIAL SINUSITIS: ICD-10-CM

## 2023-09-12 DIAGNOSIS — H10.31 ACUTE BACTERIAL CONJUNCTIVITIS OF RIGHT EYE: ICD-10-CM

## 2023-09-12 PROCEDURE — 3079F DIAST BP 80-89 MM HG: CPT | Performed by: PHYSICIAN ASSISTANT

## 2023-09-12 PROCEDURE — 99213 OFFICE O/P EST LOW 20 MIN: CPT | Performed by: PHYSICIAN ASSISTANT

## 2023-09-12 PROCEDURE — 3074F SYST BP LT 130 MM HG: CPT | Performed by: PHYSICIAN ASSISTANT

## 2023-09-12 RX ORDER — AMOXICILLIN AND CLAVULANATE POTASSIUM 875; 125 MG/1; MG/1
1 TABLET, FILM COATED ORAL 2 TIMES DAILY
Qty: 14 TABLET | Refills: 0 | Status: SHIPPED | OUTPATIENT
Start: 2023-09-12 | End: 2023-09-19

## 2023-09-12 RX ORDER — MOXIFLOXACIN 5 MG/ML
1 SOLUTION/ DROPS OPHTHALMIC 3 TIMES DAILY
Qty: 3 ML | Refills: 0 | Status: SHIPPED | OUTPATIENT
Start: 2023-09-12 | End: 2023-09-19

## 2023-09-12 RX ORDER — FLUTICASONE PROPIONATE 50 MCG
1 SPRAY, SUSPENSION (ML) NASAL DAILY
Qty: 16 G | Refills: 0 | Status: SHIPPED | OUTPATIENT
Start: 2023-09-12

## 2023-09-12 ASSESSMENT — ENCOUNTER SYMPTOMS
CHILLS: 0
ABDOMINAL PAIN: 0
EYE PAIN: 0
VOMITING: 0
EYE REDNESS: 1
SORE THROAT: 1
SHORTNESS OF BREATH: 0
WHEEZING: 0
FEVER: 0
DIAPHORESIS: 0
HEADACHES: 0
CONSTIPATION: 0
DIARRHEA: 0
NAUSEA: 0
DIZZINESS: 0
EYE DISCHARGE: 1
COUGH: 0
SINUS PAIN: 1

## 2023-09-12 ASSESSMENT — FIBROSIS 4 INDEX: FIB4 SCORE: 0.97

## 2023-09-12 NOTE — PROGRESS NOTES
"  Subjective:     Chioma Yanez  is a 63 y.o. female who presents for Pharyngitis (Fatigue, nasal congestion, loss of appetite, cough x 1 week )       She presents today with sinus congestion, sinus pain has been ongoing over the past week.  There is associated sore throat, generalized fatigue as well as loss of appetite.  She denies any recent known close sick contacts.  Did have a negative COVID test on day 2 of her symptoms.  Has been using Mucinex and ibuprofen for symptoms.  Denies chest pain or shortness of breath, no nausea or vomiting, no abdominal pain, no diarrhea.  She did develop right-sided eye redness and discharge roughly 3-4 days into her sinus symptoms duration.  Has been using allergy eyedrops for her eye redness without relief.       Review of Systems   Constitutional:  Negative for chills, diaphoresis, fever and malaise/fatigue.   HENT:  Positive for congestion, sinus pain and sore throat. Negative for ear discharge.    Eyes:  Positive for discharge and redness. Negative for pain.   Respiratory:  Negative for cough, shortness of breath and wheezing.    Cardiovascular:  Negative for chest pain.   Gastrointestinal:  Negative for abdominal pain, constipation, diarrhea, nausea and vomiting.   Genitourinary:  Negative for dysuria, frequency and urgency.   Neurological:  Negative for dizziness and headaches.      Allergies   Allergen Reactions    Codeine Shortness of Breath    Codeine Shortness of Breath     Has tolerated morphine in the past     Past Medical History:   Diagnosis Date    Arthritis     Palpitations         Objective:   /84   Pulse 75   Temp 36.9 °C (98.4 °F) (Temporal)   Resp 14   Ht 1.626 m (5' 4\")   Wt 63.5 kg (140 lb)   SpO2 95%   BMI 24.03 kg/m²   Physical Exam  Vitals and nursing note reviewed.   Constitutional:       General: She is not in acute distress.     Appearance: Normal appearance. She is not ill-appearing, toxic-appearing or diaphoretic.   HENT:      " Head: Normocephalic.      Right Ear: Tympanic membrane, ear canal and external ear normal. There is no impacted cerumen.      Left Ear: Tympanic membrane, ear canal and external ear normal. There is no impacted cerumen.      Nose: Congestion present. No rhinorrhea.      Mouth/Throat:      Mouth: Mucous membranes are moist.      Pharynx: Posterior oropharyngeal erythema present. No oropharyngeal exudate.   Eyes:      General:         Right eye: Discharge present.         Left eye: No discharge.      Extraocular Movements: Extraocular movements intact.      Pupils: Pupils are equal, round, and reactive to light.      Comments: Right eye conjunctival injection, left eye normal   Cardiovascular:      Rate and Rhythm: Normal rate and regular rhythm.   Pulmonary:      Effort: Pulmonary effort is normal. No respiratory distress.      Breath sounds: Normal breath sounds. No stridor. No wheezing or rhonchi.   Musculoskeletal:      Cervical back: Neck supple.   Lymphadenopathy:      Cervical: No cervical adenopathy.   Neurological:      General: No focal deficit present.      Mental Status: She is alert and oriented to person, place, and time.   Psychiatric:         Mood and Affect: Mood normal.         Behavior: Behavior normal.         Thought Content: Thought content normal.         Judgment: Judgment normal.             Diagnostic testing: None    Assessment/Plan:     Encounter Diagnoses   Name Primary?    Acute bacterial sinusitis     Acute bacterial conjunctivitis of right eye           Plan for care for today's complaint includes starting the patient on Augmentin and Flonase for acute bacterial sinusitis based on symptom duration and lack of improvement over the last 7 days.  Starting the patient on moxifloxacin eyedrops for acute bacterial conjunctivitis of the right eye based on clinical examination today.  Continue to monitor symptoms and return to urgent care or follow-up with primary care provider if symptoms  remain ongoing.  Follow-up in the emergency department if symptoms become severe, ER precautions discussed in office today..  Prescription for Augmentin, Flonase, moxifloxacin eyedrops provided.    See AVS Instructions below for written guidance provided to patient on after-visit management and care in addition to our verbal discussion during the visit.    Please note that this dictation was created using voice recognition software. I have attempted to correct all errors, but there may be sound-alike, spelling, grammar and possibly content errors that I did not discover before finalizing the note.    Jewel Amado PA-C

## 2023-10-04 ENCOUNTER — OFFICE VISIT (OUTPATIENT)
Dept: URGENT CARE | Facility: CLINIC | Age: 63
End: 2023-10-04
Payer: COMMERCIAL

## 2023-10-04 VITALS
HEIGHT: 64 IN | BODY MASS INDEX: 23.9 KG/M2 | RESPIRATION RATE: 16 BRPM | DIASTOLIC BLOOD PRESSURE: 80 MMHG | OXYGEN SATURATION: 98 % | HEART RATE: 78 BPM | TEMPERATURE: 98.2 F | SYSTOLIC BLOOD PRESSURE: 118 MMHG | WEIGHT: 140 LBS

## 2023-10-04 DIAGNOSIS — J06.9 VIRAL URI WITH COUGH: ICD-10-CM

## 2023-10-04 PROCEDURE — 3074F SYST BP LT 130 MM HG: CPT | Performed by: PHYSICIAN ASSISTANT

## 2023-10-04 PROCEDURE — 3079F DIAST BP 80-89 MM HG: CPT | Performed by: PHYSICIAN ASSISTANT

## 2023-10-04 PROCEDURE — 99213 OFFICE O/P EST LOW 20 MIN: CPT | Performed by: PHYSICIAN ASSISTANT

## 2023-10-04 RX ORDER — BENZONATATE 200 MG/1
200 CAPSULE ORAL 3 TIMES DAILY PRN
Qty: 30 CAPSULE | Refills: 0 | Status: SHIPPED | OUTPATIENT
Start: 2023-10-04

## 2023-10-04 ASSESSMENT — ENCOUNTER SYMPTOMS
DIARRHEA: 0
CHILLS: 0
PALPITATIONS: 0
NAUSEA: 0
HEADACHES: 0
SORE THROAT: 0
SPUTUM PRODUCTION: 0
DIAPHORESIS: 0
FEVER: 0
COUGH: 1
ABDOMINAL PAIN: 0
VOMITING: 0
WHEEZING: 0
SINUS PAIN: 0
SHORTNESS OF BREATH: 0
DIZZINESS: 0
MYALGIAS: 1

## 2023-10-04 ASSESSMENT — FIBROSIS 4 INDEX: FIB4 SCORE: 0.97

## 2023-10-04 NOTE — PROGRESS NOTES
Subjective:     CHIEF COMPLAINT  Chief Complaint   Patient presents with    Nasal Congestion     Was seen and was given antibiotics and is not feeling better    Cough     X3 days       HPI  Chioma Yanez is a very pleasant 63 y.o. female who presents to the clinic with URI-like symptoms x3 days.  Patient has been experiencing cough, congestion, fatigue and mild body aches/chills.  Does not believe she has been running a fever.  Cough is predominantly dry and nonproductive.  Seems to be worse in the evening.  No associated shortness of breath or chest pain.  No OTC medications have been started at this time.  No known ill contacts.  Took an at-home COVID test which returned negative.    REVIEW OF SYSTEMS  Review of Systems   Constitutional:  Positive for malaise/fatigue. Negative for chills, diaphoresis and fever.   HENT:  Positive for congestion. Negative for ear pain, sinus pain and sore throat.    Respiratory:  Positive for cough. Negative for sputum production, shortness of breath and wheezing.    Cardiovascular:  Negative for chest pain and palpitations.   Gastrointestinal:  Negative for abdominal pain, diarrhea, nausea and vomiting.   Musculoskeletal:  Positive for myalgias.   Neurological:  Negative for dizziness and headaches.   Endo/Heme/Allergies:  Positive for environmental allergies.       PAST MEDICAL HISTORY  Patient Active Problem List    Diagnosis Date Noted    Type 2 diabetes mellitus (HCC) 06/11/2022    Trauma 06/10/2022    Blunt abdominal trauma, initial encounter 06/10/2022    Contraindication to anticoagulation therapy 06/10/2022    Subarachnoid hemorrhage following injury, no loss of consciousness (HCC) 06/10/2022    Complex tear of lateral meniscus as current injury 12/02/2015    Palpitations 08/24/2012    Vitamin d deficiency 08/24/2012    Chest discomfort 08/24/2012    Shortness of breath 08/24/2012       SURGICAL HISTORY   has a past surgical history that includes c-sec only,prev  c-sec (1981); repair eye blowout,periorb+implnt (2006); knee arthroscopy (Left, 12/2/2015); meniscectomy (Left, 12/2/2015); and chondroplasty (Left, 12/2/2015).    ALLERGIES  Allergies   Allergen Reactions    Codeine Shortness of Breath    Codeine Shortness of Breath     Has tolerated morphine in the past       CURRENT MEDICATIONS  Home Medications       Reviewed by Te Lai P.A.-C. (Physician Assistant) on 10/04/23 at 1039  Med List Status: <None>     Medication Last Dose Status   ascorbic acid (ASCORBIC ACID) 500 MG Tab Taking Active   B Complex Vitamins (VITAMIN B COMPLEX PO) Taking Active   BENFOTIAMINE PO Taking Active   BIOTIN PO Taking Active   CALCIUM PO Taking Active   Calcium-Magnesium-Zinc (MICAELA-MAG-ZINC PO) Taking Active   cetirizine (ZYRTEC) 10 MG Tab Taking Active   Cholecalciferol (VITAMIN D3 PO) Taking Active   Cyanocobalamin (VITAMIN B-12 PO) Taking Active   cyanocobalamin (VITAMIN B-12) 100 MCG Tab Taking Active   Ferrous Sulfate (IRON PO) Taking Active   Flaxseed, Linseed, (FLAX SEED OIL PO) Taking Active   fluticasone (FLONASE) 50 MCG/ACT nasal spray Taking Active   ibuprofen (MOTRIN) 200 MG Tab Taking Active   MAGNESIUM PO Taking Active   metFORMIN (GLUCOPHAGE) 500 MG Tab Taking Active   Naproxen Sodium 220 MG Cap Taking Active   Omega-3 Fatty Acids (FISH OIL PO) Taking Active   ONETOUCH VERIO strip Taking Active   Potassium Citrate 99 MG Cap Taking Active   SELENIUM PO Taking Active   ZINC SULFATE PO Taking Active                    SOCIAL HISTORY  Social History     Tobacco Use    Smoking status: Never    Smokeless tobacco: Never   Vaping Use    Vaping Use: Never used   Substance and Sexual Activity    Alcohol use: Never    Drug use: Never    Sexual activity: Not on file       FAMILY HISTORY  Family History   Problem Relation Age of Onset    Heart Failure Father     Diabetes Other           Objective:     VITAL SIGNS: /80 (BP Location: Left arm, Patient Position: Sitting, BP Cuff  "Size: Adult)   Pulse 78   Temp 36.8 °C (98.2 °F) (Temporal)   Resp 16   Ht 1.626 m (5' 4\")   Wt 63.5 kg (140 lb)   SpO2 98%   BMI 24.03 kg/m²     PHYSICAL EXAM  Physical Exam  Constitutional:       General: She is not in acute distress.     Appearance: Normal appearance. She is not ill-appearing, toxic-appearing or diaphoretic.   HENT:      Head: Normocephalic and atraumatic.      Right Ear: Tympanic membrane, ear canal and external ear normal.      Left Ear: Tympanic membrane, ear canal and external ear normal.      Nose: Congestion present. No rhinorrhea.      Mouth/Throat:      Mouth: Mucous membranes are moist.      Pharynx: No oropharyngeal exudate or posterior oropharyngeal erythema.   Eyes:      Conjunctiva/sclera: Conjunctivae normal.   Cardiovascular:      Rate and Rhythm: Normal rate and regular rhythm.      Pulses: Normal pulses.      Heart sounds: Normal heart sounds.   Pulmonary:      Effort: Pulmonary effort is normal.      Breath sounds: Normal breath sounds. No wheezing, rhonchi or rales.   Musculoskeletal:      Cervical back: Normal range of motion. No muscular tenderness.   Lymphadenopathy:      Cervical: No cervical adenopathy.   Skin:     General: Skin is warm and dry.   Neurological:      Mental Status: She is alert.   Psychiatric:         Mood and Affect: Mood normal.         Thought Content: Thought content normal.         Assessment/Plan:     1. Viral URI with cough  - benzonatate (TESSALON) 200 MG capsule; Take 1 Capsule by mouth 3 times a day as needed for Cough.  Dispense: 30 Capsule; Refill: 0      MDM/Comments:    -Discussed viral etiology of URI.     Symptomatic care.  -Oral hydration and rest.   -Over the counter expectorant as directed; Guaifenesin (Mucinex).  -Diphenhydramine as directed for rhinorrhea (runny nose) and sneezing.  --May take over the counter pseudoephedrine for nasal congestion. Can increase your blood pressure.  -Tylenol or ibuprofen for pain and fever as " directed.   -Saline nasal spray as a decongestant.    Follow up for shortness of breath, fevers, elevated heart rate, weakness, prolonged cough, chest pain, or any other concerns.      Differential diagnosis, natural history, supportive care, and indications for immediate follow-up discussed. All questions answered. Patient agrees with the plan of care.    Follow-up as needed if symptoms worsen or fail to improve to PCP, Urgent care or Emergency Room.    I have personally reviewed prior external notes and test results pertinent to today's visit.  I have independently reviewed and interpreted all diagnostics ordered during this urgent care acute visit.   Discussed management options (risks,benefits, and alternatives to treatment). Pt expresses understanding and the treatment plan was agreed upon. Questions were encouraged and answered to pt's satisfaction.    Please note that this dictation was created using voice recognition software. I have made a reasonable attempt to correct obvious errors, but I expect that there are errors of grammar and possibly content that I did not discover before finalizing the note.

## 2024-03-28 ENCOUNTER — HOSPITAL ENCOUNTER (OUTPATIENT)
Dept: LAB | Facility: MEDICAL CENTER | Age: 64
End: 2024-03-28
Attending: FAMILY MEDICINE
Payer: COMMERCIAL

## 2024-03-28 LAB
25(OH)D3 SERPL-MCNC: 135 NG/ML (ref 30–100)
ALBUMIN SERPL BCP-MCNC: 4.4 G/DL (ref 3.2–4.9)
ALBUMIN/GLOB SERPL: 1.4 G/DL
ALP SERPL-CCNC: 86 U/L (ref 30–99)
ALT SERPL-CCNC: 21 U/L (ref 2–50)
ANION GAP SERPL CALC-SCNC: 13 MMOL/L (ref 7–16)
AST SERPL-CCNC: 14 U/L (ref 12–45)
BASOPHILS # BLD AUTO: 0.6 % (ref 0–1.8)
BASOPHILS # BLD: 0.04 K/UL (ref 0–0.12)
BILIRUB SERPL-MCNC: 0.5 MG/DL (ref 0.1–1.5)
BUN SERPL-MCNC: 13 MG/DL (ref 8–22)
CALCIUM ALBUM COR SERPL-MCNC: 9.2 MG/DL (ref 8.5–10.5)
CALCIUM SERPL-MCNC: 9.5 MG/DL (ref 8.5–10.5)
CHLORIDE SERPL-SCNC: 102 MMOL/L (ref 96–112)
CHOLEST SERPL-MCNC: 266 MG/DL (ref 100–199)
CO2 SERPL-SCNC: 24 MMOL/L (ref 20–33)
CREAT SERPL-MCNC: 0.44 MG/DL (ref 0.5–1.4)
CREAT UR-MCNC: 111.35 MG/DL
EOSINOPHIL # BLD AUTO: 0.06 K/UL (ref 0–0.51)
EOSINOPHIL NFR BLD: 0.9 % (ref 0–6.9)
ERYTHROCYTE [DISTWIDTH] IN BLOOD BY AUTOMATED COUNT: 44 FL (ref 35.9–50)
EST. AVERAGE GLUCOSE BLD GHB EST-MCNC: 232 MG/DL
FASTING STATUS PATIENT QL REPORTED: NORMAL
FOLATE SERPL-MCNC: 29.3 NG/ML
GFR SERPLBLD CREATININE-BSD FMLA CKD-EPI: 108 ML/MIN/1.73 M 2
GLOBULIN SER CALC-MCNC: 3.2 G/DL (ref 1.9–3.5)
GLUCOSE SERPL-MCNC: 243 MG/DL (ref 65–99)
HBA1C MFR BLD: 9.7 % (ref 4–5.6)
HCT VFR BLD AUTO: 44.5 % (ref 37–47)
HDLC SERPL-MCNC: 38 MG/DL
HGB BLD-MCNC: 14.7 G/DL (ref 12–16)
IMM GRANULOCYTES # BLD AUTO: 0.03 K/UL (ref 0–0.11)
IMM GRANULOCYTES NFR BLD AUTO: 0.5 % (ref 0–0.9)
LDLC SERPL CALC-MCNC: 168 MG/DL
LYMPHOCYTES # BLD AUTO: 2.12 K/UL (ref 1–4.8)
LYMPHOCYTES NFR BLD: 32.8 % (ref 22–41)
MCH RBC QN AUTO: 29.1 PG (ref 27–33)
MCHC RBC AUTO-ENTMCNC: 33 G/DL (ref 32.2–35.5)
MCV RBC AUTO: 87.9 FL (ref 81.4–97.8)
MICROALBUMIN UR-MCNC: <1.2 MG/DL
MICROALBUMIN/CREAT UR: NORMAL MG/G (ref 0–30)
MONOCYTES # BLD AUTO: 0.45 K/UL (ref 0–0.85)
MONOCYTES NFR BLD AUTO: 7 % (ref 0–13.4)
NEUTROPHILS # BLD AUTO: 3.77 K/UL (ref 1.82–7.42)
NEUTROPHILS NFR BLD: 58.2 % (ref 44–72)
NRBC # BLD AUTO: 0 K/UL
NRBC BLD-RTO: 0 /100 WBC (ref 0–0.2)
PLATELET # BLD AUTO: 266 K/UL (ref 164–446)
PMV BLD AUTO: 9.9 FL (ref 9–12.9)
POTASSIUM SERPL-SCNC: 4.1 MMOL/L (ref 3.6–5.5)
PROT SERPL-MCNC: 7.6 G/DL (ref 6–8.2)
RBC # BLD AUTO: 5.06 M/UL (ref 4.2–5.4)
SODIUM SERPL-SCNC: 139 MMOL/L (ref 135–145)
TRIGL SERPL-MCNC: 302 MG/DL (ref 0–149)
TSH SERPL DL<=0.005 MIU/L-ACNC: 2.43 UIU/ML (ref 0.38–5.33)
VIT B12 SERPL-MCNC: 1037 PG/ML (ref 211–911)
WBC # BLD AUTO: 6.5 K/UL (ref 4.8–10.8)

## 2024-03-28 PROCEDURE — 82746 ASSAY OF FOLIC ACID SERUM: CPT

## 2024-03-28 PROCEDURE — 80061 LIPID PANEL: CPT

## 2024-03-28 PROCEDURE — 80053 COMPREHEN METABOLIC PANEL: CPT

## 2024-03-28 PROCEDURE — 84443 ASSAY THYROID STIM HORMONE: CPT

## 2024-03-28 PROCEDURE — 83036 HEMOGLOBIN GLYCOSYLATED A1C: CPT

## 2024-03-28 PROCEDURE — 82607 VITAMIN B-12: CPT

## 2024-03-28 PROCEDURE — 36415 COLL VENOUS BLD VENIPUNCTURE: CPT

## 2024-03-28 PROCEDURE — 82570 ASSAY OF URINE CREATININE: CPT

## 2024-03-28 PROCEDURE — 82306 VITAMIN D 25 HYDROXY: CPT

## 2024-03-28 PROCEDURE — 85025 COMPLETE CBC W/AUTO DIFF WBC: CPT

## 2024-03-28 PROCEDURE — 82043 UR ALBUMIN QUANTITATIVE: CPT

## 2024-07-15 ENCOUNTER — HOSPITAL ENCOUNTER (OUTPATIENT)
Dept: RADIOLOGY | Facility: MEDICAL CENTER | Age: 64
End: 2024-07-15
Payer: COMMERCIAL

## 2024-07-15 DIAGNOSIS — R10.9 ABDOMINAL PAIN, UNSPECIFIED ABDOMINAL LOCATION: ICD-10-CM

## 2024-07-15 PROCEDURE — 76700 US EXAM ABDOM COMPLETE: CPT

## 2024-08-05 ENCOUNTER — PATIENT MESSAGE (OUTPATIENT)
Dept: HEALTH INFORMATION MANAGEMENT | Facility: OTHER | Age: 64
End: 2024-08-05

## 2024-08-27 ENCOUNTER — APPOINTMENT (OUTPATIENT)
Dept: RADIOLOGY | Facility: MEDICAL CENTER | Age: 64
End: 2024-08-27
Attending: EMERGENCY MEDICINE
Payer: COMMERCIAL

## 2024-08-27 ENCOUNTER — HOSPITAL ENCOUNTER (EMERGENCY)
Facility: MEDICAL CENTER | Age: 64
End: 2024-08-27
Attending: EMERGENCY MEDICINE
Payer: COMMERCIAL

## 2024-08-27 VITALS
SYSTOLIC BLOOD PRESSURE: 141 MMHG | WEIGHT: 136 LBS | BODY MASS INDEX: 23.22 KG/M2 | OXYGEN SATURATION: 93 % | TEMPERATURE: 98.6 F | HEART RATE: 72 BPM | HEIGHT: 64 IN | DIASTOLIC BLOOD PRESSURE: 77 MMHG | RESPIRATION RATE: 16 BRPM

## 2024-08-27 DIAGNOSIS — R10.9 FLANK PAIN: ICD-10-CM

## 2024-08-27 LAB
ALBUMIN SERPL BCP-MCNC: 4 G/DL (ref 3.2–4.9)
ALBUMIN/GLOB SERPL: 1.3 G/DL
ALP SERPL-CCNC: 81 U/L (ref 30–99)
ALT SERPL-CCNC: 12 U/L (ref 2–50)
ANION GAP SERPL CALC-SCNC: 14 MMOL/L (ref 7–16)
APPEARANCE UR: CLEAR
AST SERPL-CCNC: 15 U/L (ref 12–45)
BACTERIA #/AREA URNS HPF: NEGATIVE /HPF
BASOPHILS # BLD AUTO: 0.8 % (ref 0–1.8)
BASOPHILS # BLD: 0.06 K/UL (ref 0–0.12)
BILIRUB SERPL-MCNC: 0.5 MG/DL (ref 0.1–1.5)
BILIRUB UR QL STRIP.AUTO: NEGATIVE
BUN SERPL-MCNC: 11 MG/DL (ref 8–22)
CALCIUM ALBUM COR SERPL-MCNC: 9.2 MG/DL (ref 8.5–10.5)
CALCIUM SERPL-MCNC: 9.2 MG/DL (ref 8.5–10.5)
CHLORIDE SERPL-SCNC: 101 MMOL/L (ref 96–112)
CO2 SERPL-SCNC: 22 MMOL/L (ref 20–33)
COLOR UR: YELLOW
CREAT SERPL-MCNC: 0.58 MG/DL (ref 0.5–1.4)
EOSINOPHIL # BLD AUTO: 0.12 K/UL (ref 0–0.51)
EOSINOPHIL NFR BLD: 1.6 % (ref 0–6.9)
EPI CELLS #/AREA URNS HPF: NEGATIVE /HPF
ERYTHROCYTE [DISTWIDTH] IN BLOOD BY AUTOMATED COUNT: 44 FL (ref 35.9–50)
EST. AVERAGE GLUCOSE BLD GHB EST-MCNC: 223 MG/DL
GFR SERPLBLD CREATININE-BSD FMLA CKD-EPI: 101 ML/MIN/1.73 M 2
GLOBULIN SER CALC-MCNC: 3 G/DL (ref 1.9–3.5)
GLUCOSE SERPL-MCNC: 247 MG/DL (ref 65–99)
GLUCOSE UR STRIP.AUTO-MCNC: 500 MG/DL
HBA1C MFR BLD: 9.4 % (ref 4–5.6)
HCT VFR BLD AUTO: 43.6 % (ref 37–47)
HGB BLD-MCNC: 14 G/DL (ref 12–16)
HYALINE CASTS #/AREA URNS LPF: ABNORMAL /LPF
IMM GRANULOCYTES # BLD AUTO: 0.05 K/UL (ref 0–0.11)
IMM GRANULOCYTES NFR BLD AUTO: 0.7 % (ref 0–0.9)
KETONES UR STRIP.AUTO-MCNC: NEGATIVE MG/DL
LEUKOCYTE ESTERASE UR QL STRIP.AUTO: NEGATIVE
LIPASE SERPL-CCNC: 21 U/L (ref 11–82)
LYMPHOCYTES # BLD AUTO: 2.37 K/UL (ref 1–4.8)
LYMPHOCYTES NFR BLD: 32 % (ref 22–41)
MCH RBC QN AUTO: 28.9 PG (ref 27–33)
MCHC RBC AUTO-ENTMCNC: 32.1 G/DL (ref 32.2–35.5)
MCV RBC AUTO: 89.9 FL (ref 81.4–97.8)
MICRO URNS: ABNORMAL
MONOCYTES # BLD AUTO: 0.45 K/UL (ref 0–0.85)
MONOCYTES NFR BLD AUTO: 6.1 % (ref 0–13.4)
NEUTROPHILS # BLD AUTO: 4.35 K/UL (ref 1.82–7.42)
NEUTROPHILS NFR BLD: 58.8 % (ref 44–72)
NITRITE UR QL STRIP.AUTO: NEGATIVE
NRBC # BLD AUTO: 0 K/UL
NRBC BLD-RTO: 0 /100 WBC (ref 0–0.2)
PH UR STRIP.AUTO: 5.5 [PH] (ref 5–8)
PLATELET # BLD AUTO: 288 K/UL (ref 164–446)
PMV BLD AUTO: 9.5 FL (ref 9–12.9)
POTASSIUM SERPL-SCNC: 4 MMOL/L (ref 3.6–5.5)
PROT SERPL-MCNC: 7 G/DL (ref 6–8.2)
PROT UR QL STRIP: NEGATIVE MG/DL
RBC # BLD AUTO: 4.85 M/UL (ref 4.2–5.4)
RBC # URNS HPF: ABNORMAL /HPF
RBC UR QL AUTO: ABNORMAL
SODIUM SERPL-SCNC: 137 MMOL/L (ref 135–145)
SP GR UR STRIP.AUTO: 1.02
UROBILINOGEN UR STRIP.AUTO-MCNC: 0.2 MG/DL
WBC # BLD AUTO: 7.4 K/UL (ref 4.8–10.8)
WBC #/AREA URNS HPF: ABNORMAL /HPF

## 2024-08-27 PROCEDURE — 85025 COMPLETE CBC W/AUTO DIFF WBC: CPT

## 2024-08-27 PROCEDURE — 83036 HEMOGLOBIN GLYCOSYLATED A1C: CPT

## 2024-08-27 PROCEDURE — 80053 COMPREHEN METABOLIC PANEL: CPT

## 2024-08-27 PROCEDURE — 74176 CT ABD & PELVIS W/O CONTRAST: CPT

## 2024-08-27 PROCEDURE — 81001 URINALYSIS AUTO W/SCOPE: CPT

## 2024-08-27 PROCEDURE — 36415 COLL VENOUS BLD VENIPUNCTURE: CPT

## 2024-08-27 PROCEDURE — 83690 ASSAY OF LIPASE: CPT

## 2024-08-27 PROCEDURE — 99283 EMERGENCY DEPT VISIT LOW MDM: CPT

## 2024-08-27 ASSESSMENT — FIBROSIS 4 INDEX: FIB4 SCORE: 0.74

## 2024-08-27 NOTE — DISCHARGE INSTRUCTIONS
Like we talked about, I do not see a stone on the CT but your history and your urinalysis is very suggestive that you have passed a kidney stone.  Plenty of fluids.  Goal is clear colorless urine.  I have added on an A1c level; I'd like you to follow-up with your doctor for this result and further management

## 2024-08-27 NOTE — ED NOTES
Pt back from CT and connected to continuous monitor with call light and personal belongings within reach

## 2024-08-27 NOTE — ED TRIAGE NOTES
".  Chief Complaint   Patient presents with    Flank Pain     Pt was sewing purses at 0900 when pt started to have right side flank pain associated with nausea and vomiting. Pt States it was 10/10 stabbing sensation.     Medicated with 2 mg morphine in route brought pain to 0/10  Pt A&O x 4 stable on RA    N/V     Pt had 3 episodes of emesis at home, denies blood in emesis.     Pt medicated with 4 mg zofran in route      .BP (!) 148/82   Pulse 83   Temp 36.9 °C (98.4 °F) (Temporal)   Resp 14   Ht 1.626 m (5' 4\")   Wt 61.7 kg (136 lb)   SpO2 90%   BMI 23.34 kg/m²     "

## 2024-08-27 NOTE — ED NOTES
Patient updated on plan of care, all questions answered at this time.     Shalom locked and in the lowest position. Pt connected to continuous monitor with call light and personal belongings within reach.

## 2024-08-27 NOTE — ED PROVIDER NOTES
ED Provider Note    CHIEF COMPLAINT  Chief Complaint   Patient presents with    Flank Pain     Pt was sewing purses at 0900 when pt started to have right side flank pain associated with nausea and vomiting. Pt States it was 10/10 stabbing sensation.     Medicated with 2 mg morphine in route brought pain to 0/10  Pt A&O x 4 stable on RA    N/V     Pt had 3 episodes of emesis at home, denies blood in emesis.     Pt medicated with 4 mg zofran in route        EXTERNAL RECORDS REVIEWED  Outpatient labs & studies recent ultrasound shows fatty liver.  I see the CT scan in a trauma setting from 2 years ago which shows no comment on stones that time    HPI/ROS  LIMITATION TO HISTORY   Select: : None  OUTSIDE HISTORIAN(S):  EMS reports that she is much better after morphine 2 mg    Chioma Yanez is a 64 y.o. female who presents complaint of sudden onset of lower abdominal pain radiating to her right flank.  This started suddenly at 9:00 this morning while she was sewing.  It is a sharp stabbing sensation like a knife.  She has never had this before.  She had associated nausea and vomiting from the pain.  She denies any change in bowel or bladder.  Presently she is feeling fine.  No fever or chills.  There is no other complaint.    PAST MEDICAL HISTORY   has a past medical history of Arthritis and Palpitations.    SURGICAL HISTORY   has a past surgical history that includes c-sec only,prev c-sec (1981); repair eye blowout,periorb+implnt (2006); knee arthroscopy (Left, 12/2/2015); meniscectomy (Left, 12/2/2015); and chondroplasty (Left, 12/2/2015).    FAMILY HISTORY  Family History   Problem Relation Age of Onset    Heart Failure Father     Diabetes Other        SOCIAL HISTORY  Social History     Tobacco Use    Smoking status: Never    Smokeless tobacco: Never   Vaping Use    Vaping status: Never Used   Substance and Sexual Activity    Alcohol use: Never    Drug use: Never    Sexual activity: Not on file       CURRENT  "MEDICATIONS  Home Medications       Reviewed by Karie Ross R.N. (Registered Nurse) on 08/27/24 at 1020  Med List Status: Partial     Medication Last Dose Status   ascorbic acid (ASCORBIC ACID) 500 MG Tab  Active   B Complex Vitamins (VITAMIN B COMPLEX PO)  Active   BENFOTIAMINE PO  Active   benzonatate (TESSALON) 200 MG capsule  Active   BIOTIN PO  Active   CALCIUM PO  Active   Calcium-Magnesium-Zinc (MICAELA-MAG-ZINC PO)  Active   cetirizine (ZYRTEC) 10 MG Tab  Active   Cholecalciferol (VITAMIN D3 PO)  Active   Cyanocobalamin (VITAMIN B-12 PO)  Active   cyanocobalamin (VITAMIN B-12) 100 MCG Tab  Active   Ferrous Sulfate (IRON PO)  Active   Flaxseed, Linseed, (FLAX SEED OIL PO)  Active   fluticasone (FLONASE) 50 MCG/ACT nasal spray  Active   ibuprofen (MOTRIN) 200 MG Tab  Active   MAGNESIUM PO  Active   metFORMIN (GLUCOPHAGE) 500 MG Tab  Active   Naproxen Sodium 220 MG Cap  Active   Omega-3 Fatty Acids (FISH OIL PO)  Active   ONETOUCH VERIO strip  Active   Potassium Citrate 99 MG Cap  Active   SELENIUM PO  Active   ZINC SULFATE PO  Active                  Audit from Redirected Encounters    **Home medications have not yet been reviewed for this encounter**         ALLERGIES  Allergies   Allergen Reactions    Codeine Shortness of Breath    Codeine Shortness of Breath     Has tolerated morphine in the past       PHYSICAL EXAM  VITAL SIGNS: BP (!) 148/82   Pulse 83   Temp 36.9 °C (98.4 °F) (Temporal)   Resp 14   Ht 1.626 m (5' 4\")   Wt 61.7 kg (136 lb)   SpO2 97%   BMI 23.34 kg/m²    Constitutional: Well appearing patient in no acute distress.  HENT: Head is without trauma.  Oropharynx is clear.  Mucous membranes are moist.  Eyes: Sclerae are nonicteric, pupils are equally round.  Neck: Supple with grossly normal range of motion.  Cardiovascular: Heart is regular rate and rhythm without murmur rub or gallop.  Peripheral pulses are intact and symmetric throughout.  Thorax & Lungs: Breathing easily.  Good air " movement.  There is no wheeze, rhonchi or rales.  Abdomen: Bowel sounds normal, soft, non-distended, nontender, no mass nor pulsatile mass. I do not appreciate hepatosplenomegaly.  No CVA tenderness.  No apparent rash.  Skin: No apparent rash.  I do not see petechiae or purpura.  Extremities: No evidence of acute trauma.  No clubbing, cyanosis, edema, no Homans or cords.  Neurologic: Alert. Moving all extremities.  Intact sensation and strength throughout.  Gait is normal.  Psychiatric: Normal for situation      EKG/LABS  Labs Reviewed   CBC WITH DIFFERENTIAL - Abnormal; Notable for the following components:       Result Value    MCHC 32.1 (*)     All other components within normal limits   COMP METABOLIC PANEL - Abnormal; Notable for the following components:    Glucose 247 (*)     All other components within normal limits   URINALYSIS,CULTURE IF INDICATED - Abnormal; Notable for the following components:    Glucose 500 (*)     Occult Blood Large (*)     All other components within normal limits   URINE MICROSCOPIC (W/UA) - Abnormal; Notable for the following components:    RBC  (*)     All other components within normal limits   LIPASE   ESTIMATED GFR   HEMOGLOBIN A1C       I have independently interpreted this EKG    RADIOLOGY/PROCEDURES   I have independently interpreted the diagnostic imaging associated with this visit and am waiting the final reading from the radiologist.   My preliminary interpretation is as follows: No stone    Radiologist interpretation:  CT-RENAL COLIC EVALUATION(A/P W/O)   Final Result      1. No urinary tract calculus identified. No renal collecting system dilatation.      2. No evidence of inflammatory change in the abdomen or pelvis. The study is limited due to nonuse of intravenous contrast.      3. Hepatomegaly.      4. Small sliding-type hiatal hernia.          COURSE & MEDICAL DECISION MAKING    ASSESSMENT, COURSE AND PLAN  Care Narrative: The patient presents with acute  onset of lower abdominal to right flank pain.  I have a high suspicion clinically that she is in the process of passing a stone or is already passed 1.  She is presently pain-free.  Her laboratories show a hyperglycemia.  She points that she is not taking her metformin today.  She follows with her doctor for this, her last hemoglobin A1c was about 4 months ago.  I have added on another to help guide therapy such as increasing metformin versus adding another agent.  The rest of her chemistries are unremarkable, renal function.  There is no leukocytosis or shift.  Her urinalysis shows large blood without evidence of infection.  Again I think that this is going to be a stone.  However, there is no evidence of this seen on CT scan.  Could be a small stone unable to visualize or she is already passed 1.  At this point, we talked about all this, she is given my usual discussion about kidney stones.  We talked about her fatty liver on her ultrasound.  She is following with her doctor about this.  Instructions on flank pain as well as ureteral colic.    DISPOSITION AND DISCUSSIONS    FINAL DIAGNOSIS  1. Flank pain    2.  Suspect passed ureteral stone     Electronically signed by: Parrish Hernandez M.D., 8/27/2024 10:34 AM

## 2024-09-09 ENCOUNTER — HOSPITAL ENCOUNTER (OUTPATIENT)
Dept: LAB | Facility: MEDICAL CENTER | Age: 64
End: 2024-09-09
Payer: COMMERCIAL

## 2024-09-09 LAB
ALBUMIN SERPL BCP-MCNC: 4.2 G/DL (ref 3.2–4.9)
ALBUMIN/GLOB SERPL: 1.3 G/DL
ALP SERPL-CCNC: 74 U/L (ref 30–99)
ALT SERPL-CCNC: 10 U/L (ref 2–50)
ANION GAP SERPL CALC-SCNC: 15 MMOL/L (ref 7–16)
AST SERPL-CCNC: 12 U/L (ref 12–45)
BILIRUB SERPL-MCNC: 0.4 MG/DL (ref 0.1–1.5)
BUN SERPL-MCNC: 12 MG/DL (ref 8–22)
CALCIUM ALBUM COR SERPL-MCNC: 9.4 MG/DL (ref 8.5–10.5)
CALCIUM SERPL-MCNC: 9.6 MG/DL (ref 8.5–10.5)
CHLORIDE SERPL-SCNC: 103 MMOL/L (ref 96–112)
CO2 SERPL-SCNC: 23 MMOL/L (ref 20–33)
CREAT SERPL-MCNC: 0.52 MG/DL (ref 0.5–1.4)
GFR SERPLBLD CREATININE-BSD FMLA CKD-EPI: 103 ML/MIN/1.73 M 2
GLOBULIN SER CALC-MCNC: 3.2 G/DL (ref 1.9–3.5)
GLUCOSE SERPL-MCNC: 172 MG/DL (ref 65–99)
POTASSIUM SERPL-SCNC: 3.8 MMOL/L (ref 3.6–5.5)
PROT SERPL-MCNC: 7.4 G/DL (ref 6–8.2)
SODIUM SERPL-SCNC: 141 MMOL/L (ref 135–145)

## 2024-09-09 PROCEDURE — 36415 COLL VENOUS BLD VENIPUNCTURE: CPT

## 2024-09-09 PROCEDURE — 80053 COMPREHEN METABOLIC PANEL: CPT

## 2024-11-27 ENCOUNTER — HOSPITAL ENCOUNTER (OUTPATIENT)
Dept: RADIOLOGY | Facility: MEDICAL CENTER | Age: 64
End: 2024-11-27
Payer: COMMERCIAL

## 2024-11-27 DIAGNOSIS — R22.31 MASS OF FINGER OF RIGHT HAND: ICD-10-CM

## 2024-11-27 PROCEDURE — 73120 X-RAY EXAM OF HAND: CPT | Mod: RT
